# Patient Record
Sex: FEMALE | Race: ASIAN | NOT HISPANIC OR LATINO | Employment: FULL TIME | URBAN - METROPOLITAN AREA
[De-identification: names, ages, dates, MRNs, and addresses within clinical notes are randomized per-mention and may not be internally consistent; named-entity substitution may affect disease eponyms.]

---

## 2017-03-16 ENCOUNTER — COMMUNICATION - HEALTHEAST (OUTPATIENT)
Dept: FAMILY MEDICINE | Facility: CLINIC | Age: 32
End: 2017-03-16

## 2017-03-16 ENCOUNTER — COMMUNICATION - HEALTHEAST (OUTPATIENT)
Dept: SCHEDULING | Facility: CLINIC | Age: 32
End: 2017-03-16

## 2020-03-25 ENCOUNTER — OFFICE VISIT - HEALTHEAST (OUTPATIENT)
Dept: FAMILY MEDICINE | Facility: CLINIC | Age: 35
End: 2020-03-25

## 2020-03-25 DIAGNOSIS — D17.30 LIPOMA OF SKIN AND SUBCUTANEOUS TISSUE: ICD-10-CM

## 2020-12-08 ENCOUNTER — COMMUNICATION - HEALTHEAST (OUTPATIENT)
Dept: FAMILY MEDICINE | Facility: CLINIC | Age: 35
End: 2020-12-08

## 2020-12-09 ENCOUNTER — COMMUNICATION - HEALTHEAST (OUTPATIENT)
Dept: FAMILY MEDICINE | Facility: CLINIC | Age: 35
End: 2020-12-09

## 2020-12-09 DIAGNOSIS — U07.1 2019 NOVEL CORONAVIRUS DISEASE (COVID-19): ICD-10-CM

## 2020-12-09 RX ORDER — BENZONATATE 200 MG/1
200 CAPSULE ORAL 3 TIMES DAILY PRN
Qty: 15 CAPSULE | Refills: 1 | Status: SHIPPED | OUTPATIENT
Start: 2020-12-09 | End: 2024-01-18

## 2020-12-16 ENCOUNTER — COMMUNICATION - HEALTHEAST (OUTPATIENT)
Dept: FAMILY MEDICINE | Facility: CLINIC | Age: 35
End: 2020-12-16

## 2021-01-26 ENCOUNTER — COMMUNICATION - HEALTHEAST (OUTPATIENT)
Dept: FAMILY MEDICINE | Facility: CLINIC | Age: 36
End: 2021-01-26

## 2021-05-29 ENCOUNTER — RECORDS - HEALTHEAST (OUTPATIENT)
Dept: ADMINISTRATIVE | Facility: CLINIC | Age: 36
End: 2021-05-29

## 2021-05-30 ENCOUNTER — RECORDS - HEALTHEAST (OUTPATIENT)
Dept: ADMINISTRATIVE | Facility: CLINIC | Age: 36
End: 2021-05-30

## 2021-06-02 ENCOUNTER — RECORDS - HEALTHEAST (OUTPATIENT)
Dept: ADMINISTRATIVE | Facility: CLINIC | Age: 36
End: 2021-06-02

## 2021-06-04 VITALS
RESPIRATION RATE: 16 BRPM | OXYGEN SATURATION: 100 % | SYSTOLIC BLOOD PRESSURE: 134 MMHG | DIASTOLIC BLOOD PRESSURE: 88 MMHG | BODY MASS INDEX: 33.84 KG/M2 | WEIGHT: 185 LBS | TEMPERATURE: 98.2 F | HEART RATE: 95 BPM

## 2021-06-07 NOTE — PROGRESS NOTES
Assessment:     1. Lipoma of skin and subcutaneous tissue            Plan:     Symptoms consistent with a benign lipoma.  Reassurance given.  No treatment indicated.  Recommend following up if developing increased redness, warmth, pain, or any other concerning symptoms.    Subjective:       34 y.o. female presents for evaluation of a soft lump that she noticed on her left side since yesterday.  It is not been painful.  It is not been red.  She has not had a fever and is otherwise feeling well.  She denies any abdominal pain.  No other complaints or concerns today.    Patient Active Problem List   Diagnosis     Vitamin D Deficiency     Supervision of normal pregnancy     Obesity (BMI 30.0-34.9)     Postpartum tubal ligation planned      (normal spontaneous vaginal delivery)       Past Medical History:   Diagnosis Date     Intrauterine device (IUD) contraceptive failure resulting in pregnancy 2015       Past Surgical History:   Procedure Laterality Date     POSTPARTUM TUBAL LIGATION Bilateral 2016    Procedure: TUBAL LIGATION POST PARTUM;  Surgeon: Jessenia Neal MD;  Location: [facility];  Service:        Current Outpatient Medications on File Prior to Visit   Medication Sig Dispense Refill     famotidine (PEPCID) 20 MG tablet Take 1 tablet (20 mg total) by mouth bedtime. 30 tablet 0     No current facility-administered medications on file prior to visit.        Allergies   Allergen Reactions     No Known Drug Allergies        Family History   Problem Relation Age of Onset     Diabetes Mother      Depression Mother      Hyperlipidemia Father      No Medical Problems Sister      No Medical Problems Sister      No Medical Problems Sister      No Medical Problems Sister      No Medical Problems Sister      No Medical Problems Brother      No Medical Problems Brother        Social History     Socioeconomic History     Marital status: Single     Spouse name: None     Number of children: None      Years of education: None     Highest education level: None   Occupational History     None   Social Needs     Financial resource strain: None     Food insecurity     Worry: None     Inability: None     Transportation needs     Medical: None     Non-medical: None   Tobacco Use     Smoking status: Never Smoker     Smokeless tobacco: Never Used   Substance and Sexual Activity     Alcohol use: No     Drug use: No     Sexual activity: Yes     Partners: Male   Lifestyle     Physical activity     Days per week: None     Minutes per session: None     Stress: None   Relationships     Social connections     Talks on phone: None     Gets together: None     Attends [employer] service: None     Active member of club or organization: None     Attends meetings of clubs or organizations: None     Relationship status: None     Intimate partner violence     Fear of current or ex partner: None     Emotionally abused: None     Physically abused: None     Forced sexual activity: None   Other Topics Concern     None   Social History Narrative     None         Review of Systems  A 12 point comprehensive review of systems was negative except as noted.      Objective:      /88 (Patient Site: Left Arm, Patient Position: Sitting, Cuff Size: Adult Regular)   Pulse 95   Temp 98.2  F (36.8  C) (Oral)   Resp 16   Wt 185 lb (83.9 kg)   LMP 02/21/2020   SpO2 100%   BMI 33.84 kg/m    General appearance: alert, appears stated age and cooperative  Skin: Patient with a soft, nontender, subcutaneous mass overlying her left anterior lateral chest wall just inferior and lateral to her left breast that is fairly well circumscribed.  There is no overlying redness.  No fluctuance or induration.  Lump measures approximately 6 cm x 5 cm.        This note has been dictated using voice recognition software. Any grammatical or context distortions are unintentional and inherent to the software

## 2021-06-13 NOTE — TELEPHONE ENCOUNTER
Faxed form to [employer] successfully.  Received fax confirmation.  Called pt to relay will be mailing copy of form to her.  Thanks,

## 2021-06-13 NOTE — TELEPHONE ENCOUNTER
Who is requesting the letter?  Patient  Why is the letter needed? Patient tested positive for Covid & needs a letter for her employer. She has been out today & yesterday. She is requesting a letter excusing her from work this week. Her symptoms are not improving, per patient.  How would you like this letter returned? Please send letter to her MyChart  Okay to leave a detailed message? Yes

## 2021-06-13 NOTE — TELEPHONE ENCOUNTER
Writer called the patient. The patient still has a cough. Her symptoms began on December 2, 2020, patient was tested on December 3, 2020. Results came in on 12/4/2020 (POSITIVE).     The patient was tested for COVID at the [facility] in [place], [place] (walk-in's). The patient does not feel great. The patient has a headache, fever, extreme fatigue, chills, night sweats, sore throat, diarrhea.

## 2021-06-13 NOTE — TELEPHONE ENCOUNTER
Completed form and called pt to relay PCP is out until 12/29.  Does she wish covering provider to sign or wait for PCP.  Thanks.

## 2021-06-13 NOTE — TELEPHONE ENCOUNTER
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  CMT completed and prepped form.  PCP is not back until 12/29.  Pt asked if covering provider could sign paperwork.  Placing on covering provider's desk. Please return to CMT.  Thanks.

## 2021-06-13 NOTE — TELEPHONE ENCOUNTER
I'm not sure how I can provide a letter with this minimal information.  Generally speaking, she should be able to show her employer the positive result, then they can follow guidelines from [employer] regarding when to return to work (10 days after positive test, unless not improving or was hospitalized).    If does really need note, I need to know when her symptoms started, when and where she was tested, and how she's feeling now.

## 2021-06-13 NOTE — TELEPHONE ENCOUNTER
Writer apologizes but no fax number available to writer. This is not in Media and CMT did not see forms. MD to clarify.

## 2021-06-18 NOTE — PATIENT INSTRUCTIONS - HE
Patient Instructions by Emmy Anthony MD at 3/25/2020  5:00 PM     Author: Emmy Anthony MD Service: -- Author Type: Physician    Filed: 3/25/2020  5:01 PM Encounter Date: 3/25/2020 Status: Signed    : Emmy Anthony MD (Physician)         Patient Education     Lipoma, No Treatment  A lipoma is a local overgrowth of fatty tissue. It appears as a soft raised area, usually less than 2 inches across. It is a benign condition (not cancer).   Home care  General information regarding lipoma includes:  1. No special care is needed for a lipoma.  2. You can consider removal for cosmetic reasons.  3. Sometimes lipomas are uncomfortable because they put pressure on surrounding tissues. This is also a reason to have a lipoma removed.  Follow-up care  Follow up with your healthcare provider, or as advised if you want to have the lipoma removed at a later time.  When to seek medical advice  Call your healthcare provider right away if any of the following occur:    Redness, pain, tenderness, or drainage from the lipoma    Lipoma begins to enlarge or change shape    Changes in the color of the skin over the lipoma  Date Last Reviewed: 6/1/2016 2000-2017 [employer]. [address] [place] [address], [place] [address]. All rights reserved. This information is not intended as a substitute for professional medical care. Always follow your healthcare professional's instructions.

## 2021-06-21 NOTE — LETTER
Letter by Steph Huerta MD at      Author: Steph Huerta MD Service: -- Author Type: --    Filed:  Encounter Date: 12/8/2020 Status: (Other)         Priya Davis  [address] [place] [address]                 December 8, 2020       To whom it may concern,      Priya Davis is my patient. Priya tested positive for COVID-19 disease on December 3, 2020.         Sincerely,        Electronically signed by Steph Huerta MD

## 2021-06-21 NOTE — LETTER
Letter by Steph Huerta MD at      Author: Steph Huerta MD Service: -- Author Type: --    Filed:  Encounter Date: 1/26/2021 Status: (Other)         January 26, 2021     Patient: Priya Davis   YOB: 1985       To Whom It May Concern:    Priya Davis reported to clinic that she was not feeling well 1/25/21-1/26/21.  Please excuse her from work on those days.    Sincerely,        Electronically signed by Steph Huerta MD

## 2021-06-27 ENCOUNTER — HEALTH MAINTENANCE LETTER (OUTPATIENT)
Age: 36
End: 2021-06-27

## 2021-10-17 ENCOUNTER — HEALTH MAINTENANCE LETTER (OUTPATIENT)
Age: 36
End: 2021-10-17

## 2022-07-24 ENCOUNTER — HEALTH MAINTENANCE LETTER (OUTPATIENT)
Age: 37
End: 2022-07-24

## 2022-10-02 ENCOUNTER — HEALTH MAINTENANCE LETTER (OUTPATIENT)
Age: 37
End: 2022-10-02

## 2023-02-06 ENCOUNTER — HOSPITAL ENCOUNTER (EMERGENCY)
Facility: HOSPITAL | Age: 38
Discharge: HOME | End: 2023-02-07
Attending: FAMILY MEDICINE | Admitting: FAMILY MEDICINE
Payer: COMMERCIAL

## 2023-02-06 DIAGNOSIS — R10.13 DYSPEPSIA: ICD-10-CM

## 2023-02-06 DIAGNOSIS — R10.13 EPIGASTRIC PAIN: ICD-10-CM

## 2023-02-06 PROCEDURE — 99284 EMERGENCY DEPT VISIT MOD MDM: CPT | Mod: 25

## 2023-02-06 PROCEDURE — 96374 THER/PROPH/DIAG INJ IV PUSH: CPT

## 2023-02-06 PROCEDURE — 93005 ELECTROCARDIOGRAM TRACING: CPT | Performed by: STUDENT IN AN ORGANIZED HEALTH CARE EDUCATION/TRAINING PROGRAM

## 2023-02-07 VITALS
HEIGHT: 61 IN | TEMPERATURE: 97.5 F | DIASTOLIC BLOOD PRESSURE: 94 MMHG | RESPIRATION RATE: 20 BRPM | BODY MASS INDEX: 35.12 KG/M2 | WEIGHT: 186 LBS | HEART RATE: 79 BPM | OXYGEN SATURATION: 99 % | SYSTOLIC BLOOD PRESSURE: 127 MMHG

## 2023-02-07 LAB
ALBUMIN SERPL BCG-MCNC: 4 G/DL (ref 3.5–5.2)
ALP SERPL-CCNC: 71 U/L (ref 35–104)
ALT SERPL W P-5'-P-CCNC: 14 U/L (ref 10–35)
ANION GAP SERPL CALCULATED.3IONS-SCNC: 9 MMOL/L (ref 7–15)
AST SERPL W P-5'-P-CCNC: 15 U/L (ref 10–35)
BASOPHILS # BLD AUTO: 0.1 10E3/UL (ref 0–0.2)
BASOPHILS NFR BLD AUTO: 1 %
BILIRUB DIRECT SERPL-MCNC: <0.2 MG/DL (ref 0–0.3)
BILIRUB SERPL-MCNC: 0.3 MG/DL
BUN SERPL-MCNC: 12.2 MG/DL (ref 6–20)
CALCIUM SERPL-MCNC: 9.1 MG/DL (ref 8.6–10)
CHLORIDE SERPL-SCNC: 101 MMOL/L (ref 98–107)
CREAT SERPL-MCNC: 0.83 MG/DL (ref 0.51–0.95)
DEPRECATED HCO3 PLAS-SCNC: 27 MMOL/L (ref 22–29)
EOSINOPHIL # BLD AUTO: 0.2 10E3/UL (ref 0–0.7)
EOSINOPHIL NFR BLD AUTO: 1 %
ERYTHROCYTE [DISTWIDTH] IN BLOOD BY AUTOMATED COUNT: 12.2 % (ref 10–15)
GFR SERPL CREATININE-BSD FRML MDRD: >90 ML/MIN/1.73M2
GLUCOSE SERPL-MCNC: 160 MG/DL (ref 70–99)
HCT VFR BLD AUTO: 38.4 % (ref 35–47)
HGB BLD-MCNC: 12.6 G/DL (ref 11.7–15.7)
IMM GRANULOCYTES # BLD: 0.1 10E3/UL
IMM GRANULOCYTES NFR BLD: 0 %
INR PPP: 1.01 (ref 0.85–1.15)
LIPASE SERPL-CCNC: 22 U/L (ref 13–60)
LYMPHOCYTES # BLD AUTO: 2.1 10E3/UL (ref 0.8–5.3)
LYMPHOCYTES NFR BLD AUTO: 18 %
MAGNESIUM SERPL-MCNC: 1.7 MG/DL (ref 1.7–2.3)
MCH RBC QN AUTO: 29.2 PG (ref 26.5–33)
MCHC RBC AUTO-ENTMCNC: 32.8 G/DL (ref 31.5–36.5)
MCV RBC AUTO: 89 FL (ref 78–100)
MONOCYTES # BLD AUTO: 0.7 10E3/UL (ref 0–1.3)
MONOCYTES NFR BLD AUTO: 6 %
NEUTROPHILS # BLD AUTO: 8.7 10E3/UL (ref 1.6–8.3)
NEUTROPHILS NFR BLD AUTO: 74 %
NRBC # BLD AUTO: 0 10E3/UL
NRBC BLD AUTO-RTO: 0 /100
PLATELET # BLD AUTO: 434 10E3/UL (ref 150–450)
POTASSIUM SERPL-SCNC: 3.9 MMOL/L (ref 3.4–5.3)
PROT SERPL-MCNC: 7.6 G/DL (ref 6.4–8.3)
RBC # BLD AUTO: 4.32 10E6/UL (ref 3.8–5.2)
SODIUM SERPL-SCNC: 137 MMOL/L (ref 136–145)
TROPONIN T SERPL HS-MCNC: <6 NG/L
WBC # BLD AUTO: 11.7 10E3/UL (ref 4–11)

## 2023-02-07 PROCEDURE — 85048 AUTOMATED LEUKOCYTE COUNT: CPT | Performed by: FAMILY MEDICINE

## 2023-02-07 PROCEDURE — 250N000013 HC RX MED GY IP 250 OP 250 PS 637: Performed by: FAMILY MEDICINE

## 2023-02-07 PROCEDURE — 84484 ASSAY OF TROPONIN QUANT: CPT | Performed by: FAMILY MEDICINE

## 2023-02-07 PROCEDURE — 83690 ASSAY OF LIPASE: CPT | Performed by: FAMILY MEDICINE

## 2023-02-07 PROCEDURE — 82248 BILIRUBIN DIRECT: CPT | Performed by: FAMILY MEDICINE

## 2023-02-07 PROCEDURE — 250N000009 HC RX 250: Performed by: FAMILY MEDICINE

## 2023-02-07 PROCEDURE — 250N000011 HC RX IP 250 OP 636: Performed by: FAMILY MEDICINE

## 2023-02-07 PROCEDURE — 36415 COLL VENOUS BLD VENIPUNCTURE: CPT | Performed by: FAMILY MEDICINE

## 2023-02-07 PROCEDURE — 85610 PROTHROMBIN TIME: CPT | Performed by: FAMILY MEDICINE

## 2023-02-07 PROCEDURE — 83735 ASSAY OF MAGNESIUM: CPT | Performed by: FAMILY MEDICINE

## 2023-02-07 RX ORDER — SUCRALFATE 1 G/1
1 TABLET ORAL 4 TIMES DAILY
Qty: 28 TABLET | Refills: 0 | Status: SHIPPED | OUTPATIENT
Start: 2023-02-07 | End: 2023-02-14

## 2023-02-07 RX ORDER — FAMOTIDINE 20 MG/1
20 TABLET, FILM COATED ORAL AT BEDTIME
Qty: 30 TABLET | Refills: 0 | Status: SHIPPED | OUTPATIENT
Start: 2023-02-07 | End: 2024-01-18

## 2023-02-07 RX ADMIN — LIDOCAINE HYDROCHLORIDE 30 ML: 20 SOLUTION ORAL; TOPICAL at 01:09

## 2023-02-07 RX ADMIN — FAMOTIDINE 20 MG: 10 INJECTION, SOLUTION INTRAVENOUS at 01:08

## 2023-02-07 ASSESSMENT — ENCOUNTER SYMPTOMS
NAUSEA: 1
CHEST TIGHTNESS: 1
VOMITING: 0

## 2023-02-07 ASSESSMENT — ACTIVITIES OF DAILY LIVING (ADL): ADLS_ACUITY_SCORE: 35

## 2023-02-07 NOTE — ED TRIAGE NOTES
CP, epigastric pain, nausea, for 1 hour. Has had episodes in the past, treated for heartburn. She tried 500mg Tylenol and 3 Tums, and 10ml of Pepto with no relief. Pain a 7. Non smoker.     Triage Assessment       Row Name 02/06/23 4316       Triage Assessment (Adult)    Airway WDL WDL       Respiratory WDL    Respiratory WDL WDL       Skin Circulation/Temperature WDL    Skin Circulation/Temperature WDL WDL       Cardiac WDL    Cardiac WDL WDL       Peripheral/Neurovascular WDL    Peripheral Neurovascular WDL WDL       Cognitive/Neuro/Behavioral WDL    Cognitive/Neuro/Behavioral WDL WDL

## 2023-02-07 NOTE — ED PROVIDER NOTES
EMERGENCY DEPARTMENT ENCOUNTER      NAME: Priya Davis  AGE: 37 year old female  YOB: 1985  MRN: [id number]  EVALUATION DATE & TIME: No admission date for patient encounter.    PCP: Steph Huerta    ED PROVIDER: Star Farooq M.D.    Chief Complaint   Patient presents with     CP/heart burn/nausea       FINAL IMPRESSION:  1. Epigastric pain    2. Dyspepsia        ED COURSE & MEDICAL DECISION MAKING:    Pertinent Labs & Imaging studies independently interpreted by me. (See chart for details)  11:57 PM Patient seen and examined, external records reviewed show prior evaluation in the emergency department for chest pain, however this was about 5 years ago and no recent emergency department visits.  Patient presents today with about 1 hour of epigastric pain, with tenderness on exam.  EKG is reassuring.  Labs are ordered.  Consider right upper quadrant ultrasound but no right upper quadrant tenderness on exam.  If labs are concerning for acute cholecystitis or biliary obstruction, will order imaging either ultrasound or CT.  GI cocktail and Pepcid are ordered for symptom management.  1:30 AM labs demonstrate mild leukocytosis, hepatic function panel and lipase are normal, basic panel is reassuring.  Troponin is pending.  If this is normal, patient can be discharged with outpatient follow-up.  1:37 AM troponin less than 6 which is rules out for myocardial injury.  Patient feels better after treatment.  Patient will be discharged with Carafate and Pepcid, follow-up with primary care.  Use Maalox as well as needed.    At the conclusion of the encounter I discussed the results of all of the tests and the disposition. The questions were answered. The patient or family acknowledged understanding and was agreeable with the care plan.     Medical Decision Making    History:    Supplemental history from: Documented in chart, if applicable    External Record(s) reviewed: Documented in chart, if  applicable.    Work Up:    Chart documentation includes differential considered and any EKGs or imaging independently interpreted by provider, where specified.    In additional to work up documented, I considered the following work up: Documented in chart, if applicable.    External consultation:    Discussion of management with another provider: Documented in chart, if applicable    Complicating factors:    Care impacted by chronic illness: Other: Dyspepsia    Care affected by social determinants of health: N/A    Disposition considerations: Discharge. I prescribed additional prescription strength medication(s) as charted. I considered admission, but discharged patient after significant clinical improvement.      MEDICATIONS GIVEN IN THE EMERGENCY:  Medications   famotidine (PEPCID) injection 20 mg (20 mg Intravenous Given 2/7/23 0108)   lidocaine (viscous) (XYLOCAINE) 2 % 15 mL, alum & mag hydroxide-simethicone (MAALOX) 15 mL GI Cocktail (30 mLs Oral Given 2/7/23 0109)       NEW PRESCRIPTIONS STARTED AT TODAY'S ER VISIT  New Prescriptions    SUCRALFATE (CARAFATE) 1 GM TABLET    Take 1 tablet (1 g) by mouth 4 times daily for 7 days       =================================================================    HPI    Patient information was obtained from: Patient      Priya Davis is a 37 year old female with no pertinent medical history who presents to this ED via private vehicle for evaluation of chest pain. Patient states that ~1 hour ago she developed epigastric chest pain with no radiation. She describes it as tight in nature and it is 7/10 severity. She notes that she had a late dinner and attributed her symptoms to acid reflux. She has taken 500 mg tylenol, 3 Tums, and 10 mL of Pepto with no relief. Pain is exacerbated with laying down and breathing. She endorses having similar episodes in the past. Endorses nausea. Denies vomiting.    Of note, patient denies a history of smoking and endorses occasional alcohol use  "with none today. She also endorses caffeine use but none today. Denies any routine medication or recent illness.      REVIEW OF SYSTEMS   Review of Systems   Respiratory: Positive for chest tightness.    Cardiovascular: Positive for chest pain.   Gastrointestinal: Positive for nausea. Negative for vomiting.      All other systems reviewed and negative    PAST MEDICAL HISTORY:  Past Medical History:   Diagnosis Date     Intrauterine device (IUD) contraceptive failure resulting in pregnancy 5/27/2015       PAST SURGICAL HISTORY:  Past Surgical History:   Procedure Laterality Date     POSTPARTUM TUBAL LIGATION Bilateral 7/28/2016    Procedure: TUBAL LIGATION POST PARTUM;  Surgeon: Jessenia Neal MD;  Location: [facility];  Service:        CURRENT MEDICATIONS:    No current facility-administered medications for this encounter.     Current Outpatient Medications   Medication     famotidine (PEPCID) 20 MG tablet     sucralfate (CARAFATE) 1 GM tablet     benzonatate (TESSALON) 200 MG capsule       ALLERGIES:  Allergies   Allergen Reactions     No Known Drug Allergies Unknown       FAMILY HISTORY:  Family History   Problem Relation Age of Onset     Diabetes Mother      Depression Mother      Hyperlipidemia Father      No Known Problems Sister      No Known Problems Sister      No Known Problems Sister      No Known Problems Sister      No Known Problems Sister      No Known Problems Brother      No Known Problems Brother        SOCIAL HISTORY:   Social History     Socioeconomic History     Marital status: Single   Tobacco Use     Smoking status: Never     Smokeless tobacco: Never   Substance and Sexual Activity     Alcohol use: No     Drug use: No     Sexual activity: Yes     Partners: Male       VITALS:  BP (!) 167/104   Pulse 79   Temp 97.5  F (36.4  C) (Temporal)   Resp 20   Ht 1.549 m (5' 1\")   Wt 84.4 kg (186 lb)   LMP 01/27/2023   SpO2 100%   BMI 35.14 kg/m      PHYSICAL EXAM:  Physical " Exam  Vitals and nursing note reviewed.   Constitutional:       Appearance: Normal appearance.   HENT:      Head: Normocephalic and atraumatic.      Right Ear: External ear normal.      Left Ear: External ear normal.      Nose: Nose normal.      Mouth/Throat:      Mouth: Mucous membranes are moist.   Eyes:      Extraocular Movements: Extraocular movements intact.      Conjunctiva/sclera: Conjunctivae normal.      Pupils: Pupils are equal, round, and reactive to light.   Cardiovascular:      Rate and Rhythm: Normal rate and regular rhythm.   Pulmonary:      Effort: Pulmonary effort is normal.      Breath sounds: Normal breath sounds. No wheezing or rales.   Chest:      Chest wall: Tenderness (epigastric) present.   Abdominal:      General: Abdomen is flat. There is no distension.      Palpations: Abdomen is soft.      Tenderness: There is no abdominal tenderness. There is no guarding.   Musculoskeletal:         General: Normal range of motion.      Cervical back: Normal range of motion and neck supple.      Right lower leg: No edema.      Left lower leg: No edema.   Lymphadenopathy:      Cervical: No cervical adenopathy.   Skin:     General: Skin is warm and dry.   Neurological:      General: No focal deficit present.      Mental Status: She is alert and oriented to person, place, and time. Mental status is at baseline.      Comments: No gross focal neurologic deficits   Psychiatric:         Mood and Affect: Mood normal.         Behavior: Behavior normal.         Thought Content: Thought content normal.          LAB:  All pertinent labs reviewed and interpreted.  Results for orders placed or performed during the hospital encounter of 02/06/23   Basic metabolic panel   Result Value Ref Range    Sodium 137 136 - 145 mmol/L    Potassium 3.9 3.4 - 5.3 mmol/L    Chloride 101 98 - 107 mmol/L    Carbon Dioxide (CO2) 27 22 - 29 mmol/L    Anion Gap 9 7 - 15 mmol/L    Urea Nitrogen 12.2 6.0 - 20.0 mg/dL    Creatinine 0.83 0.51  - 0.95 mg/dL    Calcium 9.1 8.6 - 10.0 mg/dL    Glucose 160 (H) 70 - 99 mg/dL    GFR Estimate >90 >60 mL/min/1.73m2   Result Value Ref Range    INR 1.01 0.85 - 1.15   Result Value Ref Range    Troponin T, High Sensitivity <6 <=14 ng/L   Result Value Ref Range    Magnesium 1.7 1.7 - 2.3 mg/dL   Result Value Ref Range    Lipase 22 13 - 60 U/L   Hepatic function panel   Result Value Ref Range    Protein Total 7.6 6.4 - 8.3 g/dL    Albumin 4.0 3.5 - 5.2 g/dL    Bilirubin Total 0.3 <=1.2 mg/dL    Alkaline Phosphatase 71 35 - 104 U/L    AST 15 10 - 35 U/L    ALT 14 10 - 35 U/L    Bilirubin Direct <0.20 0.00 - 0.30 mg/dL   CBC with platelets and differential   Result Value Ref Range    WBC Count 11.7 (H) 4.0 - 11.0 10e3/uL    RBC Count 4.32 3.80 - 5.20 10e6/uL    Hemoglobin 12.6 11.7 - 15.7 g/dL    Hematocrit 38.4 35.0 - 47.0 %    MCV 89 78 - 100 fL    MCH 29.2 26.5 - 33.0 pg    MCHC 32.8 31.5 - 36.5 g/dL    RDW 12.2 10.0 - 15.0 %    Platelet Count 434 150 - 450 10e3/uL    % Neutrophils 74 %    % Lymphocytes 18 %    % Monocytes 6 %    % Eosinophils 1 %    % Basophils 1 %    % Immature Granulocytes 0 %    NRBCs per 100 WBC 0 <1 /100    Absolute Neutrophils 8.7 (H) 1.6 - 8.3 10e3/uL    Absolute Lymphocytes 2.1 0.8 - 5.3 10e3/uL    Absolute Monocytes 0.7 0.0 - 1.3 10e3/uL    Absolute Eosinophils 0.2 0.0 - 0.7 10e3/uL    Absolute Basophils 0.1 0.0 - 0.2 10e3/uL    Absolute Immature Granulocytes 0.1 <=0.4 10e3/uL    Absolute NRBCs 0.0 10e3/uL       RADIOLOGY:  Reviewed all pertinent imaging. Please see official radiology report.  No orders to display       EKG:    Performed at: 11:37 PM  Impression: Normal EKG  Rate: 70  Rhythm: Sinus  Axis: Normal  SD Interval: 166  QRS Interval: 94  QTc Interval: 442  ST Changes: No acute ischemic changes  Comparison: June 2017, no acute changes    I have independently reviewed and interpreted the EKG(s) documented above.    IAn, am serving as a scribe to document services  personally performed by Dr. Farooq based on my observation and the provider's statements to me. I, Star Farooq MD attest that An Myers is acting in a scribe capacity, has observed my performance of the services and has documented them in accordance with my direction.    Star Farooq M.D.  Emergency Medicine  [facility] EMERGENCY DEPARTMENT  [address] [place] [id number] [phone]  Dept: [phone]     Star Farooq MD  02/07/23 0219

## 2023-02-09 ENCOUNTER — APPOINTMENT (OUTPATIENT)
Dept: ULTRASOUND IMAGING | Facility: HOSPITAL | Age: 38
End: 2023-02-09
Attending: STUDENT IN AN ORGANIZED HEALTH CARE EDUCATION/TRAINING PROGRAM
Payer: COMMERCIAL

## 2023-02-09 ENCOUNTER — HOSPITAL ENCOUNTER (OUTPATIENT)
Facility: HOSPITAL | Age: 38
Discharge: HOME | End: 2023-02-10
Attending: STUDENT IN AN ORGANIZED HEALTH CARE EDUCATION/TRAINING PROGRAM | Admitting: SPECIALIST
Payer: COMMERCIAL

## 2023-02-09 DIAGNOSIS — R10.13 EPIGASTRIC PAIN: ICD-10-CM

## 2023-02-09 LAB
BASOPHILS # BLD AUTO: 0.1 10E3/UL (ref 0–0.2)
BASOPHILS NFR BLD AUTO: 1 %
EOSINOPHIL # BLD AUTO: 0.2 10E3/UL (ref 0–0.7)
EOSINOPHIL NFR BLD AUTO: 1 %
ERYTHROCYTE [DISTWIDTH] IN BLOOD BY AUTOMATED COUNT: 12.3 % (ref 10–15)
HCT VFR BLD AUTO: 40.7 % (ref 35–47)
HGB BLD-MCNC: 13.4 G/DL (ref 11.7–15.7)
IMM GRANULOCYTES # BLD: 0.1 10E3/UL
IMM GRANULOCYTES NFR BLD: 0 %
LYMPHOCYTES # BLD AUTO: 2.8 10E3/UL (ref 0.8–5.3)
LYMPHOCYTES NFR BLD AUTO: 21 %
MCH RBC QN AUTO: 29.2 PG (ref 26.5–33)
MCHC RBC AUTO-ENTMCNC: 32.9 G/DL (ref 31.5–36.5)
MCV RBC AUTO: 89 FL (ref 78–100)
MONOCYTES # BLD AUTO: 0.7 10E3/UL (ref 0–1.3)
MONOCYTES NFR BLD AUTO: 5 %
NEUTROPHILS # BLD AUTO: 9.8 10E3/UL (ref 1.6–8.3)
NEUTROPHILS NFR BLD AUTO: 72 %
NRBC # BLD AUTO: 0 10E3/UL
NRBC BLD AUTO-RTO: 0 /100
PLATELET # BLD AUTO: 440 10E3/UL (ref 150–450)
RBC # BLD AUTO: 4.59 10E6/UL (ref 3.8–5.2)
WBC # BLD AUTO: 13.5 10E3/UL (ref 4–11)

## 2023-02-09 PROCEDURE — 250N000013 HC RX MED GY IP 250 OP 250 PS 637: Performed by: STUDENT IN AN ORGANIZED HEALTH CARE EDUCATION/TRAINING PROGRAM

## 2023-02-09 PROCEDURE — C9803 HOPD COVID-19 SPEC COLLECT: HCPCS

## 2023-02-09 PROCEDURE — 99285 EMERGENCY DEPT VISIT HI MDM: CPT | Mod: 25

## 2023-02-09 PROCEDURE — 76705 ECHO EXAM OF ABDOMEN: CPT

## 2023-02-09 PROCEDURE — 36415 COLL VENOUS BLD VENIPUNCTURE: CPT | Performed by: STUDENT IN AN ORGANIZED HEALTH CARE EDUCATION/TRAINING PROGRAM

## 2023-02-09 PROCEDURE — 86901 BLOOD TYPING SEROLOGIC RH(D): CPT | Performed by: STUDENT IN AN ORGANIZED HEALTH CARE EDUCATION/TRAINING PROGRAM

## 2023-02-09 PROCEDURE — 85025 COMPLETE CBC W/AUTO DIFF WBC: CPT | Performed by: STUDENT IN AN ORGANIZED HEALTH CARE EDUCATION/TRAINING PROGRAM

## 2023-02-09 PROCEDURE — 96375 TX/PRO/DX INJ NEW DRUG ADDON: CPT

## 2023-02-09 PROCEDURE — 96365 THER/PROPH/DIAG IV INF INIT: CPT

## 2023-02-09 PROCEDURE — 84703 CHORIONIC GONADOTROPIN ASSAY: CPT | Performed by: STUDENT IN AN ORGANIZED HEALTH CARE EDUCATION/TRAINING PROGRAM

## 2023-02-09 PROCEDURE — 93005 ELECTROCARDIOGRAM TRACING: CPT | Performed by: STUDENT IN AN ORGANIZED HEALTH CARE EDUCATION/TRAINING PROGRAM

## 2023-02-09 PROCEDURE — 83690 ASSAY OF LIPASE: CPT | Performed by: STUDENT IN AN ORGANIZED HEALTH CARE EDUCATION/TRAINING PROGRAM

## 2023-02-09 PROCEDURE — 86850 RBC ANTIBODY SCREEN: CPT | Performed by: STUDENT IN AN ORGANIZED HEALTH CARE EDUCATION/TRAINING PROGRAM

## 2023-02-09 PROCEDURE — 250N000011 HC RX IP 250 OP 636: Performed by: STUDENT IN AN ORGANIZED HEALTH CARE EDUCATION/TRAINING PROGRAM

## 2023-02-09 PROCEDURE — 250N000009 HC RX 250: Performed by: STUDENT IN AN ORGANIZED HEALTH CARE EDUCATION/TRAINING PROGRAM

## 2023-02-09 RX ORDER — ONDANSETRON 2 MG/ML
4 INJECTION INTRAMUSCULAR; INTRAVENOUS ONCE
Status: COMPLETED | OUTPATIENT
Start: 2023-02-09 | End: 2023-02-09

## 2023-02-09 RX ADMIN — ALUMINUM HYDROXIDE, MAGNESIUM HYDROXIDE, AND SIMETHICONE 30 ML: 200; 200; 20 SUSPENSION ORAL at 23:22

## 2023-02-09 RX ADMIN — ONDANSETRON 4 MG: 2 INJECTION INTRAMUSCULAR; INTRAVENOUS at 23:33

## 2023-02-09 ASSESSMENT — ENCOUNTER SYMPTOMS
FEVER: 0
BLOOD IN STOOL: 0
DIARRHEA: 0
DYSURIA: 0
ABDOMINAL PAIN: 1
NAUSEA: 1
COUGH: 0
VOMITING: 0
HEMATURIA: 0

## 2023-02-09 ASSESSMENT — ACTIVITIES OF DAILY LIVING (ADL): ADLS_ACUITY_SCORE: 33

## 2023-02-10 ENCOUNTER — ANESTHESIA EVENT (OUTPATIENT)
Dept: SURGERY | Facility: HOSPITAL | Age: 38
End: 2023-02-10
Payer: COMMERCIAL

## 2023-02-10 ENCOUNTER — TELEPHONE (OUTPATIENT)
Dept: SURGERY | Facility: CLINIC | Age: 38
End: 2023-02-10
Payer: COMMERCIAL

## 2023-02-10 ENCOUNTER — ANESTHESIA (OUTPATIENT)
Dept: SURGERY | Facility: HOSPITAL | Age: 38
End: 2023-02-10
Payer: COMMERCIAL

## 2023-02-10 VITALS
HEIGHT: 61 IN | DIASTOLIC BLOOD PRESSURE: 84 MMHG | HEART RATE: 97 BPM | WEIGHT: 192.3 LBS | OXYGEN SATURATION: 98 % | RESPIRATION RATE: 17 BRPM | TEMPERATURE: 97.6 F | BODY MASS INDEX: 36.31 KG/M2 | SYSTOLIC BLOOD PRESSURE: 139 MMHG

## 2023-02-10 DIAGNOSIS — R10.13 EPIGASTRIC PAIN: ICD-10-CM

## 2023-02-10 LAB
ABO/RH(D): NORMAL
ALBUMIN SERPL BCG-MCNC: 4 G/DL (ref 3.5–5.2)
ALP SERPL-CCNC: 79 U/L (ref 35–104)
ALT SERPL W P-5'-P-CCNC: 35 U/L (ref 10–35)
ANION GAP SERPL CALCULATED.3IONS-SCNC: 10 MMOL/L (ref 7–15)
ANTIBODY SCREEN: NEGATIVE
APTT PPP: 25 SECONDS (ref 22–38)
AST SERPL W P-5'-P-CCNC: 73 U/L (ref 10–35)
BILIRUB DIRECT SERPL-MCNC: 0.26 MG/DL (ref 0–0.3)
BILIRUB SERPL-MCNC: 0.6 MG/DL
BUN SERPL-MCNC: 12 MG/DL (ref 6–20)
CALCIUM SERPL-MCNC: 8.8 MG/DL (ref 8.6–10)
CHLORIDE SERPL-SCNC: 101 MMOL/L (ref 98–107)
CREAT SERPL-MCNC: 0.73 MG/DL (ref 0.51–0.95)
DEPRECATED HCO3 PLAS-SCNC: 26 MMOL/L (ref 22–29)
GFR SERPL CREATININE-BSD FRML MDRD: >90 ML/MIN/1.73M2
GLUCOSE SERPL-MCNC: 158 MG/DL (ref 70–99)
HCG SERPL QL: NEGATIVE
INR PPP: 1 (ref 0.85–1.15)
LIPASE SERPL-CCNC: 26 U/L (ref 13–60)
POTASSIUM SERPL-SCNC: 3.7 MMOL/L (ref 3.4–5.3)
PROT SERPL-MCNC: 7.4 G/DL (ref 6.4–8.3)
SARS-COV-2 RNA RESP QL NAA+PROBE: NEGATIVE
SODIUM SERPL-SCNC: 137 MMOL/L (ref 136–145)
SPECIMEN EXPIRATION DATE: NORMAL
TROPONIN T SERPL HS-MCNC: <6 NG/L

## 2023-02-10 PROCEDURE — 250N000011 HC RX IP 250 OP 636: Performed by: NURSE ANESTHETIST, CERTIFIED REGISTERED

## 2023-02-10 PROCEDURE — U0003 INFECTIOUS AGENT DETECTION BY NUCLEIC ACID (DNA OR RNA); SEVERE ACUTE RESPIRATORY SYNDROME CORONAVIRUS 2 (SARS-COV-2) (CORONAVIRUS DISEASE [COVID-19]), AMPLIFIED PROBE TECHNIQUE, MAKING USE OF HIGH THROUGHPUT TECHNOLOGIES AS DESCRIBED BY CMS-2020-01-R: HCPCS | Performed by: STUDENT IN AN ORGANIZED HEALTH CARE EDUCATION/TRAINING PROGRAM

## 2023-02-10 PROCEDURE — 250N000011 HC RX IP 250 OP 636: Performed by: ANESTHESIOLOGY

## 2023-02-10 PROCEDURE — 82248 BILIRUBIN DIRECT: CPT | Performed by: STUDENT IN AN ORGANIZED HEALTH CARE EDUCATION/TRAINING PROGRAM

## 2023-02-10 PROCEDURE — 710N000009 HC RECOVERY PHASE 1, LEVEL 1, PER MIN: Performed by: SPECIALIST

## 2023-02-10 PROCEDURE — 710N000012 HC RECOVERY PHASE 2, PER MINUTE: Performed by: SPECIALIST

## 2023-02-10 PROCEDURE — 99204 OFFICE O/P NEW MOD 45 MIN: CPT | Mod: 57 | Performed by: SPECIALIST

## 2023-02-10 PROCEDURE — 47562 LAPAROSCOPIC CHOLECYSTECTOMY: CPT | Performed by: SPECIALIST

## 2023-02-10 PROCEDURE — 250N000011 HC RX IP 250 OP 636: Performed by: STUDENT IN AN ORGANIZED HEALTH CARE EDUCATION/TRAINING PROGRAM

## 2023-02-10 PROCEDURE — 250N000011 HC RX IP 250 OP 636: Performed by: SPECIALIST

## 2023-02-10 PROCEDURE — 370N000017 HC ANESTHESIA TECHNICAL FEE, PER MIN: Performed by: SPECIALIST

## 2023-02-10 PROCEDURE — 360N000076 HC SURGERY LEVEL 3, PER MIN: Performed by: SPECIALIST

## 2023-02-10 PROCEDURE — 258N000003 HC RX IP 258 OP 636: Performed by: SPECIALIST

## 2023-02-10 PROCEDURE — 258N000003 HC RX IP 258 OP 636: Performed by: NURSE ANESTHETIST, CERTIFIED REGISTERED

## 2023-02-10 PROCEDURE — 85730 THROMBOPLASTIN TIME PARTIAL: CPT | Performed by: STUDENT IN AN ORGANIZED HEALTH CARE EDUCATION/TRAINING PROGRAM

## 2023-02-10 PROCEDURE — 36415 COLL VENOUS BLD VENIPUNCTURE: CPT | Performed by: STUDENT IN AN ORGANIZED HEALTH CARE EDUCATION/TRAINING PROGRAM

## 2023-02-10 PROCEDURE — 250N000009 HC RX 250: Performed by: NURSE ANESTHETIST, CERTIFIED REGISTERED

## 2023-02-10 PROCEDURE — 250N000013 HC RX MED GY IP 250 OP 250 PS 637: Performed by: SPECIALIST

## 2023-02-10 PROCEDURE — 88304 TISSUE EXAM BY PATHOLOGIST: CPT | Mod: TC | Performed by: SPECIALIST

## 2023-02-10 PROCEDURE — 84484 ASSAY OF TROPONIN QUANT: CPT | Performed by: STUDENT IN AN ORGANIZED HEALTH CARE EDUCATION/TRAINING PROGRAM

## 2023-02-10 PROCEDURE — 272N000001 HC OR GENERAL SUPPLY STERILE: Performed by: SPECIALIST

## 2023-02-10 PROCEDURE — 85610 PROTHROMBIN TIME: CPT | Performed by: STUDENT IN AN ORGANIZED HEALTH CARE EDUCATION/TRAINING PROGRAM

## 2023-02-10 RX ORDER — NALOXONE HYDROCHLORIDE 0.4 MG/ML
0.4 INJECTION, SOLUTION INTRAMUSCULAR; INTRAVENOUS; SUBCUTANEOUS
Status: DISCONTINUED | OUTPATIENT
Start: 2023-02-10 | End: 2023-02-10 | Stop reason: HOSPADM

## 2023-02-10 RX ORDER — PIPERACILLIN SODIUM, TAZOBACTAM SODIUM 3; .375 G/15ML; G/15ML
3.38 INJECTION, POWDER, LYOPHILIZED, FOR SOLUTION INTRAVENOUS ONCE
Status: COMPLETED | OUTPATIENT
Start: 2023-02-10 | End: 2023-02-10

## 2023-02-10 RX ORDER — NALOXONE HYDROCHLORIDE 0.4 MG/ML
0.2 INJECTION, SOLUTION INTRAMUSCULAR; INTRAVENOUS; SUBCUTANEOUS
Status: DISCONTINUED | OUTPATIENT
Start: 2023-02-10 | End: 2023-02-10 | Stop reason: HOSPADM

## 2023-02-10 RX ORDER — FENTANYL CITRATE 50 UG/ML
INJECTION, SOLUTION INTRAMUSCULAR; INTRAVENOUS PRN
Status: DISCONTINUED | OUTPATIENT
Start: 2023-02-10 | End: 2023-02-10

## 2023-02-10 RX ORDER — OXYCODONE HYDROCHLORIDE 5 MG/1
5 TABLET ORAL EVERY 4 HOURS PRN
Status: DISCONTINUED | OUTPATIENT
Start: 2023-02-10 | End: 2023-02-10 | Stop reason: HOSPADM

## 2023-02-10 RX ORDER — PROPOFOL 10 MG/ML
INJECTION, EMULSION INTRAVENOUS PRN
Status: DISCONTINUED | OUTPATIENT
Start: 2023-02-10 | End: 2023-02-10

## 2023-02-10 RX ORDER — HALOPERIDOL 5 MG/ML
1 INJECTION INTRAMUSCULAR
Status: DISCONTINUED | OUTPATIENT
Start: 2023-02-10 | End: 2023-02-10 | Stop reason: HOSPADM

## 2023-02-10 RX ORDER — KETOROLAC TROMETHAMINE 30 MG/ML
INJECTION, SOLUTION INTRAMUSCULAR; INTRAVENOUS PRN
Status: DISCONTINUED | OUTPATIENT
Start: 2023-02-10 | End: 2023-02-10

## 2023-02-10 RX ORDER — IBUPROFEN 200 MG
600 TABLET ORAL
Status: DISCONTINUED | OUTPATIENT
Start: 2023-02-10 | End: 2023-02-10 | Stop reason: HOSPADM

## 2023-02-10 RX ORDER — HYDROCODONE BITARTRATE AND ACETAMINOPHEN 5; 325 MG/1; MG/1
1 TABLET ORAL
Status: COMPLETED | OUTPATIENT
Start: 2023-02-10 | End: 2023-02-10

## 2023-02-10 RX ORDER — ONDANSETRON 2 MG/ML
4 INJECTION INTRAMUSCULAR; INTRAVENOUS EVERY 30 MIN PRN
Status: DISCONTINUED | OUTPATIENT
Start: 2023-02-10 | End: 2023-02-10 | Stop reason: HOSPADM

## 2023-02-10 RX ORDER — SODIUM CHLORIDE, SODIUM LACTATE, POTASSIUM CHLORIDE, CALCIUM CHLORIDE 600; 310; 30; 20 MG/100ML; MG/100ML; MG/100ML; MG/100ML
INJECTION, SOLUTION INTRAVENOUS CONTINUOUS
Status: DISCONTINUED | OUTPATIENT
Start: 2023-02-10 | End: 2023-02-10 | Stop reason: HOSPADM

## 2023-02-10 RX ORDER — SODIUM CHLORIDE, SODIUM LACTATE, POTASSIUM CHLORIDE, CALCIUM CHLORIDE 600; 310; 30; 20 MG/100ML; MG/100ML; MG/100ML; MG/100ML
INJECTION, SOLUTION INTRAVENOUS CONTINUOUS PRN
Status: DISCONTINUED | OUTPATIENT
Start: 2023-02-10 | End: 2023-02-10

## 2023-02-10 RX ORDER — HYDROCODONE BITARTRATE AND ACETAMINOPHEN 5; 325 MG/1; MG/1
1-2 TABLET ORAL EVERY 6 HOURS PRN
Qty: 20 TABLET | Refills: 0 | Status: SHIPPED | OUTPATIENT
Start: 2023-02-10 | End: 2024-01-18

## 2023-02-10 RX ORDER — SODIUM CHLORIDE, SODIUM LACTATE, POTASSIUM CHLORIDE, AND CALCIUM CHLORIDE .6; .31; .03; .02 G/100ML; G/100ML; G/100ML; G/100ML
IRRIGANT IRRIGATION PRN
Status: DISCONTINUED | OUTPATIENT
Start: 2023-02-10 | End: 2023-02-10 | Stop reason: HOSPADM

## 2023-02-10 RX ORDER — FENTANYL CITRATE 50 UG/ML
50 INJECTION, SOLUTION INTRAMUSCULAR; INTRAVENOUS EVERY 5 MIN PRN
Status: DISCONTINUED | OUTPATIENT
Start: 2023-02-10 | End: 2023-02-10 | Stop reason: HOSPADM

## 2023-02-10 RX ORDER — ONDANSETRON 4 MG/1
4 TABLET, ORALLY DISINTEGRATING ORAL EVERY 30 MIN PRN
Status: DISCONTINUED | OUTPATIENT
Start: 2023-02-10 | End: 2023-02-10 | Stop reason: HOSPADM

## 2023-02-10 RX ORDER — FENTANYL CITRATE 50 UG/ML
25 INJECTION, SOLUTION INTRAMUSCULAR; INTRAVENOUS
Status: DISCONTINUED | OUTPATIENT
Start: 2023-02-10 | End: 2023-02-10 | Stop reason: HOSPADM

## 2023-02-10 RX ORDER — BUPIVACAINE HYDROCHLORIDE 2.5 MG/ML
INJECTION, SOLUTION INFILTRATION; PERINEURAL PRN
Status: DISCONTINUED | OUTPATIENT
Start: 2023-02-10 | End: 2023-02-10 | Stop reason: HOSPADM

## 2023-02-10 RX ORDER — DEXAMETHASONE SODIUM PHOSPHATE 10 MG/ML
INJECTION, SOLUTION INTRAMUSCULAR; INTRAVENOUS PRN
Status: DISCONTINUED | OUTPATIENT
Start: 2023-02-10 | End: 2023-02-10

## 2023-02-10 RX ORDER — LIDOCAINE 40 MG/G
CREAM TOPICAL
Status: CANCELLED | OUTPATIENT
Start: 2023-02-10

## 2023-02-10 RX ORDER — SODIUM CHLORIDE, SODIUM LACTATE, POTASSIUM CHLORIDE, CALCIUM CHLORIDE 600; 310; 30; 20 MG/100ML; MG/100ML; MG/100ML; MG/100ML
INJECTION, SOLUTION INTRAVENOUS CONTINUOUS
Status: CANCELLED | OUTPATIENT
Start: 2023-02-10

## 2023-02-10 RX ORDER — HYDROCODONE BITARTRATE AND ACETAMINOPHEN 5; 325 MG/1; MG/1
1-2 TABLET ORAL EVERY 6 HOURS PRN
Qty: 20 TABLET | Refills: 0 | Status: SHIPPED | OUTPATIENT
Start: 2023-02-10 | End: 2023-02-10

## 2023-02-10 RX ORDER — ONDANSETRON 2 MG/ML
INJECTION INTRAMUSCULAR; INTRAVENOUS PRN
Status: DISCONTINUED | OUTPATIENT
Start: 2023-02-10 | End: 2023-02-10

## 2023-02-10 RX ORDER — GLYCOPYRROLATE 0.2 MG/ML
INJECTION, SOLUTION INTRAMUSCULAR; INTRAVENOUS PRN
Status: DISCONTINUED | OUTPATIENT
Start: 2023-02-10 | End: 2023-02-10

## 2023-02-10 RX ORDER — PROPOFOL 10 MG/ML
INJECTION, EMULSION INTRAVENOUS CONTINUOUS PRN
Status: DISCONTINUED | OUTPATIENT
Start: 2023-02-10 | End: 2023-02-10

## 2023-02-10 RX ORDER — KETAMINE HYDROCHLORIDE 10 MG/ML
INJECTION INTRAMUSCULAR; INTRAVENOUS PRN
Status: DISCONTINUED | OUTPATIENT
Start: 2023-02-10 | End: 2023-02-10

## 2023-02-10 RX ORDER — HYDROMORPHONE HYDROCHLORIDE 1 MG/ML
0.2 INJECTION, SOLUTION INTRAMUSCULAR; INTRAVENOUS; SUBCUTANEOUS
Status: DISCONTINUED | OUTPATIENT
Start: 2023-02-10 | End: 2023-02-10 | Stop reason: HOSPADM

## 2023-02-10 RX ORDER — LIDOCAINE HYDROCHLORIDE 10 MG/ML
INJECTION, SOLUTION INFILTRATION; PERINEURAL PRN
Status: DISCONTINUED | OUTPATIENT
Start: 2023-02-10 | End: 2023-02-10

## 2023-02-10 RX ADMIN — SODIUM CHLORIDE, POTASSIUM CHLORIDE, SODIUM LACTATE AND CALCIUM CHLORIDE: 600; 310; 30; 20 INJECTION, SOLUTION INTRAVENOUS at 05:36

## 2023-02-10 RX ADMIN — GLYCOPYRROLATE 0.2 MG: 0.2 INJECTION, SOLUTION INTRAMUSCULAR; INTRAVENOUS at 05:52

## 2023-02-10 RX ADMIN — LIDOCAINE HYDROCHLORIDE 30 MG: 10 INJECTION, SOLUTION INFILTRATION; PERINEURAL at 05:52

## 2023-02-10 RX ADMIN — FENTANYL CITRATE 50 MCG: 50 INJECTION, SOLUTION INTRAMUSCULAR; INTRAVENOUS at 07:38

## 2023-02-10 RX ADMIN — FENTANYL CITRATE 100 MCG: 50 INJECTION, SOLUTION INTRAMUSCULAR; INTRAVENOUS at 05:52

## 2023-02-10 RX ADMIN — ONDANSETRON 4 MG: 2 INJECTION INTRAMUSCULAR; INTRAVENOUS at 06:08

## 2023-02-10 RX ADMIN — KETAMINE HYDROCHLORIDE 30 MG: 10 INJECTION, SOLUTION INTRAMUSCULAR; INTRAVENOUS at 05:52

## 2023-02-10 RX ADMIN — MIDAZOLAM 2 MG: 1 INJECTION INTRAMUSCULAR; INTRAVENOUS at 05:44

## 2023-02-10 RX ADMIN — HYDROMORPHONE HYDROCHLORIDE 0.2 MG: 1 INJECTION, SOLUTION INTRAMUSCULAR; INTRAVENOUS; SUBCUTANEOUS at 01:36

## 2023-02-10 RX ADMIN — DEXAMETHASONE SODIUM PHOSPHATE 10 MG: 10 INJECTION, SOLUTION INTRAMUSCULAR; INTRAVENOUS at 05:52

## 2023-02-10 RX ADMIN — PROPOFOL 200 MG: 10 INJECTION, EMULSION INTRAVENOUS at 05:52

## 2023-02-10 RX ADMIN — KETOROLAC TROMETHAMINE 30 MG: 30 INJECTION, SOLUTION INTRAMUSCULAR at 06:57

## 2023-02-10 RX ADMIN — PROPOFOL 200 MCG/KG/MIN: 10 INJECTION, EMULSION INTRAVENOUS at 05:52

## 2023-02-10 RX ADMIN — ROCURONIUM BROMIDE 50 MG: 50 INJECTION, SOLUTION INTRAVENOUS at 05:52

## 2023-02-10 RX ADMIN — HYDROCODONE BITARTRATE AND ACETAMINOPHEN 1 TABLET: 5; 325 TABLET ORAL at 08:53

## 2023-02-10 RX ADMIN — SUGAMMADEX 200 MG: 100 INJECTION, SOLUTION INTRAVENOUS at 06:57

## 2023-02-10 RX ADMIN — PHENYLEPHRINE HYDROCHLORIDE 100 MCG: 10 INJECTION INTRAVENOUS at 05:52

## 2023-02-10 RX ADMIN — FENTANYL CITRATE 25 MCG: 50 INJECTION, SOLUTION INTRAMUSCULAR; INTRAVENOUS at 07:56

## 2023-02-10 RX ADMIN — PIPERACILLIN AND TAZOBACTAM 3.38 G: 3; .375 INJECTION, POWDER, LYOPHILIZED, FOR SOLUTION INTRAVENOUS at 01:36

## 2023-02-10 ASSESSMENT — ACTIVITIES OF DAILY LIVING (ADL)
ADLS_ACUITY_SCORE: 35

## 2023-02-10 NOTE — H&P
History and physical- Surgery  Priya Davis,  1985, MRN [id number]    Admitting Dx: Epigastric pain [R10.13]    PCP: Steph Huerta, [phone]   Code status:  No Order       Extended Emergency Contact Information  Primary Emergency Contact: Raymond Weston   [facility]  Home Phone: [phone]  Relation: Significant other       Assessment and Plan   Impression:  Acute cholecystitis.  The patient's pain is somewhat better but on ultrasound she has a thickened gallbladder wall with a mildly dilated common bile duct consistent with acute cholecystitis.  Her LFTs are normal save for a slightly elevated AST.  I definitely recommend laparoscopic cholecystectomy.  Risk and benefits of the surgery explained to the patient and she agrees to proceed.      Plan:  Laparoscopic cholecystectomy.  The patient should be able to be discharged home immediately following surgery.           Chief Complaint <principal problem not specified>       HPI    We have been requested by Dr. Franco  to evaluate Priya Davis for cholecystitis.  This is a 37 year old year old female presented to the emergency room last evening with abdominal pain.  She has had multiple mild episodes in the past few months but over the last couple weeks has had 2 particularly bad episodes.  This 1 just would not go away.  Started after dinner.  Located in the right upper quadrant.  She was somewhat improved after a GI cocktail but an ultrasound shows a thickened gallbladder wall with stones and a slightly dilated common bile duct.  In addition her white blood count is elevated.  Her only previous abdominal surgery is a tubal ligation.       Medical History  Patient Active Problem List   Diagnosis     Vitamin D Deficiency     Obesity (BMI 30.0-34.9)       [unfilled] Surgical History  Past Surgical History:   Procedure Laterality Date     POSTPARTUM TUBAL LIGATION Bilateral 2016    Procedure: TUBAL LIGATION POST PARTUM;  Surgeon: Jessenia Neal MD;   Location: [facility] OR;  Service:         Social History  Social History     Tobacco Use     Smoking status: Never     Smokeless tobacco: Never   Substance Use Topics     Alcohol use: No     Drug use: No       Allergies  Allergies   Allergen Reactions     No Known Drug Allergies Unknown    Family History  Reviewed, and family history includes Depression in her mother; Diabetes in her mother; Hyperlipidemia in her father; No Known Problems in her brother, brother, sister, sister, sister, sister, and sister.  The Family history is not pertinent to the patients chief complaint. Psychosocial Needs  Social History     Social History Narrative     Not on file     Additional psychosocial needs reviewed per nursing assessment.     Prior to Admission Medications   Current Outpatient Medications   Medication Instructions     benzonatate (TESSALON) 200 mg, Oral, 3 TIMES DAILY PRN     famotidine (PEPCID) 20 mg, Oral, AT BEDTIME     sucralfate (CARAFATE) 1 g, Oral, 4 TIMES DAILY           Review of Systems:  Pertinent items are noted in HPI. Physical Exam:  Temp:  [98.4  F (36.9  C)] 98.4  F (36.9  C)  Pulse:  [77-95] 77  Resp:  [16] 16  BP: (109-136)/(63-85) 117/85  SpO2:  [95 %-99 %] 98 %    General appearance: alert, appears stated age and cooperative  Eyes: No scleral icterus  Lungs: Breathing comfortably.  No shortness of breath  Heart: Regular.  Not tachycardic  Abdomen: Soft.  Minimal tenderness in the right upper quadrant.  Extremities: No abnormalities noted.    Skin: Skin color, texture, turgor normal. No rashes or lesions  Neurologic: Grossly normal       Pertinent Labs  Lab Results: personally reviewed.   Lab Results   Component Value Date     02/10/2023    CO2 26 02/10/2023    BUN 12.0 02/10/2023     Lab Results   Component Value Date    WBC 13.5 02/09/2023    HGB 13.4 02/09/2023    HCT 40.7 02/09/2023    MCV 89 02/09/2023     02/09/2023        Pertinent Radiology  Radiology Results: Personally  reviewed impression/s  EKG Results: not reviewed.

## 2023-02-10 NOTE — ANESTHESIA PREPROCEDURE EVALUATION
Anesthesia Pre-Procedure Evaluation    Patient: Priya Davis   MRN: [id number] : 1985        Procedure : Procedure(s):  CHOLECYSTECTOMY, LAPAROSCOPIC          Past Medical History:   Diagnosis Date     Intrauterine device (IUD) contraceptive failure resulting in pregnancy 2015      Past Surgical History:   Procedure Laterality Date     POSTPARTUM TUBAL LIGATION Bilateral 2016    Procedure: TUBAL LIGATION POST PARTUM;  Surgeon: Jessenia Neal MD;  [place] [facility] [place]  Service:       Allergies   Allergen Reactions     No Known Drug Allergies Unknown      Social History     Tobacco Use     Smoking status: Never     Smokeless tobacco: Never   Substance Use Topics     Alcohol use: No      Wt Readings from Last 1 Encounters:   23 87.2 kg (192 lb 4.8 oz)        Anesthesia Evaluation   Pt has had prior anesthetic.     No history of anesthetic complications       ROS/MED HX  ENT/Pulmonary:  - neg pulmonary ROS     Neurologic:  - neg neurologic ROS     Cardiovascular:  - neg cardiovascular ROS     METS/Exercise Tolerance: >4 METS    Hematologic:  - neg hematologic  ROS     Musculoskeletal:  - neg musculoskeletal ROS     GI/Hepatic:     (+) GERD, cholecystitis/cholelithiasis (acute cholecystitis),     Renal/Genitourinary:  - neg Renal ROS     Endo:     (+) Obesity (BMI 36),     Psychiatric/Substance Use:       Infectious Disease:       Malignancy:       Other:            Physical Exam    Airway        Mallampati: III   TM distance: > 3 FB   Neck ROM: full   Mouth opening: > 3 cm    Respiratory Devices and Support         Dental     Comment: good        Cardiovascular   cardiovascular exam normal          Pulmonary   pulmonary exam normal                OUTSIDE LABS:  CBC:   Lab Results   Component Value Date    WBC 13.5 (H) 2023    WBC 11.7 (H) 2023    HGB 13.4 2023    HGB 12.6 2023    HCT 40.7 2023    HCT 38.4 2023     2023      02/07/2023     BMP:   Lab Results   Component Value Date     02/10/2023     02/07/2023    POTASSIUM 3.7 02/10/2023    POTASSIUM 3.9 02/07/2023    CHLORIDE 101 02/10/2023    CHLORIDE 101 02/07/2023    CO2 26 02/10/2023    CO2 27 02/07/2023    BUN 12.0 02/10/2023    BUN 12.2 02/07/2023    CR 0.73 02/10/2023    CR 0.83 02/07/2023     (H) 02/10/2023     (H) 02/07/2023     COAGS:   Lab Results   Component Value Date    PTT 25 02/10/2023    INR 1.00 02/10/2023     POC:   Lab Results   Component Value Date    HCGS Negative 02/09/2023     HEPATIC:   Lab Results   Component Value Date    ALBUMIN 4.0 02/10/2023    PROTTOTAL 7.4 02/10/2023    ALT 35 02/10/2023    AST 73 (H) 02/10/2023    ALKPHOS 79 02/10/2023    BILITOTAL 0.6 02/10/2023     OTHER:   Lab Results   Component Value Date    LUIS 8.8 02/10/2023    MAG 1.7 02/07/2023    LIPASE 26 02/09/2023       Anesthesia Plan    ASA Status:  2, emergent    NPO Status:  NPO Appropriate    Anesthesia Type: General.     - Airway: ETT   Induction: Intravenous, RSI, Propofol.   Maintenance: TIVA.        Consents    Anesthesia Plan(s) and associated risks, benefits, and realistic alternatives discussed. Questions answered and patient/representative(s) expressed understanding.     - Discussed: Risks, Benefits and Alternatives for BOTH SEDATION and the PROCEDURE were discussed     - Discussed with:  Patient      - Extended Intubation/Ventilatory Support Discussed: No.      - Patient is DNR/DNI Status: No    Use of blood products discussed: Yes.     - Discussed with: Patient.     - Consented: consented to blood products            Reason for refusal: other.     Postoperative Care    Pain management: Multi-modal analgesia.   PONV prophylaxis: Ondansetron (or other 5HT-3), Dexamethasone or Solumedrol     Comments:    Other Comments: RSI with rocuronium    Glidescope prn  Ketamine 30 mg with robinul  TIVA              Choco Silverio MD

## 2023-02-10 NOTE — ANESTHESIA PROCEDURE NOTES
Airway         Procedure Start/Stop Times: 2/10/2023 5:54 AM  Staff -        Performed By: CRNA  Consent for Airway        Urgency: elective  Indications and Patient Condition       Indications for airway management: allison-procedural       Induction type:intravenous       Mask difficulty assessment: 2 - vent by mask + OA or adjuvant +/- NMBA    Final Airway Details       Final airway type: endotracheal airway       Successful airway: ETT - single and Oral  Endotracheal Airway Details        ETT size (mm): 7.0       Cuffed: yes       Cuff volume (mL): 10       Successful intubation technique: video laryngoscopy       VL Blade Size: Glidescope 4       Adjucts: stylet       Position: Center       Measured from: gums/teeth       Secured at (cm): 22    Post intubation assessment        Placement verified by: capnometry, equal breath sounds and chest rise        Number of attempts at approach: 1       Secured with: silk tape       Ease of procedure: easy       Dentition: Intact and Unchanged    Medication(s) Administered   Medication Administration Time: 2/10/2023 5:54 AM

## 2023-02-10 NOTE — ED PROVIDER NOTES
NAME: Priya Davis  AGE: 37 year old female  YOB: 1985  MRN: [id number]  EVALUATION DATE & TIME: 2023 10:50 PM    PCP: Steph Huerta  ED PROVIDER: Yulisa Franco MD.    Chief Complaint   Patient presents with     Heartburn       FINAL IMPRESSION:  1. Epigastric pain        MEDICAL DECISION MAKIN:00 PM I met with the patient, obtained history, performed an initial exam, and discussed options and plan for diagnostics and treatment here in the ED.  12:30 AM Patient reassessed. She is feeling improved.   1:08 AM Spoke with Dr. Andrade, general surgery.   1:18 AM Updated the patient with treatment plan. Will transfer patient to the OR.     ED Course as of 02/10/23 0128   Thu 2023   2306 38 y/o F who presents with epigastric pain.  Patient was seen in the emergency department recently for this and had reassuring labs and improvement with GI cocktail and pepcid. She is having worsening epigastric pain that started 1 hour PTA after eating some pasta. She took pepcid, did not  the carafate yet. Dx includes but not limited to gastritis, cholecystitis, biliary colic, ulcer, pancreatitis, atypical ACS, among others. Plan for GI cocktail, labs and with 2nd visit/worsening pain will get imaging and will start with RUQ ultrasound.   2333 EKG is sinus rhythm with some T wave flattening, no ST elevation or depression.   Fri Feb 10, 2023   0030 RUQ US:   1.  Cholelithiasis. Gallbladder wall thickness upper limits of normal. Sonographic Akhtar sign is positive. Findings would be suspicious for potential cholecystitis in the appropriate clinical setting. Clinical correlation.  2.  Common bile duct upper limits of normal at 6 mm.  3.  Diffuse fatty infiltration of the liver.    Reevaluated patient and pain has resolved. No tenderness on repeat exam. AST 73, otherwise LFTs WNL, lipase normal. WBC 13.5.   0108 Discussed with general surgery, Dr. Andrade--plan for OR, possibly this morning. Will start  "zosyn.   0120 Updated patient. Pain is returning some, pain medication ordered. She is in agreement with plan and her questions were answered.       Medical Decision Making    History:    Supplemental history from: Documented in chart, if applicable    External Record(s) reviewed: Documented in chart, if applicable.    Work Up:    Chart documentation includes differential considered and any EKGs or imaging interpreted by provider.    In additional to work up documented, I considered the following work up: Documented in chart, if applicable.    External consultation:    Discussion of management with another provider: Documented in chart, if applicable    Complicating factors:    Care impacted by chronic illness: N/A    Care affected by social determinants of health: N/A    Disposition considerations: To OR    MEDICATIONS GIVEN IN THE EMERGENCY:  Medications   piperacillin-tazobactam (ZOSYN) 3.375 g vial to attach to  mL bag (has no administration in time range)   HYDROmorphone (PF) (DILAUDID) injection 0.2 mg (has no administration in time range)   lidocaine (viscous) (XYLOCAINE) 2 % 15 mL, alum & mag hydroxide-simethicone (MAALOX) 15 mL GI Cocktail (30 mLs Oral Given 2/9/23 2322)   ondansetron (ZOFRAN) injection 4 mg (4 mg Intravenous Given 2/9/23 2333)       NEW PRESCRIPTIONS STARTED AT TODAY'S ER VISIT:  New Prescriptions    No medications on file        =================================================================  HPI    Patient information was obtained from: patient   Use of : N/A       Priya Davis is a 37 year old female with a past medical history of obesity, who presents mid epigastric abdominal pain.    Patient here with sudden onset of mid-epigastric abdominal pain an hour after eating pasta. She describes the pain as a \"tight uncomfortable\" sensation. Pain does slightly radiate to the right but not all the way. Additionally, she is nauseous but no vomiting. She says this feels similar " to her prior pain from before but more severe. Last time, the medications she was given in the ED did provide relief. She has taken Pepcid today but has not gotten her Carafate filled. She has had a tubal ligation but no other abdominal surgeries. No fevers, cough, diarrhea, dysuria, hematuria, blood or dark stool or abnormal discharge. No other medical complaints at this time.     Chart Review:  2/6/23: [facility] ED: Patient presented for chest pain. EKG reassuring. GI cocktail and Pepcid was given. Hepatic function panel and lipase normal. BMP normal. Troponin negative. No imaging performed at the time. Patient discharged in stable condition.       REVIEW OF SYSTEMS   Review of Systems   Constitutional: Negative for fever.   Respiratory: Negative for cough.    Gastrointestinal: Positive for abdominal pain (mid-epigastric) and nausea. Negative for blood in stool, diarrhea and vomiting.   Genitourinary: Negative.  Negative for dysuria, hematuria and vaginal discharge.   All other systems reviewed and are negative.       PAST MEDICAL HISTORY:  Past Medical History:   Diagnosis Date     Intrauterine device (IUD) contraceptive failure resulting in pregnancy 5/27/2015       PAST SURGICAL HISTORY:  Past Surgical History:   Procedure Laterality Date     POSTPARTUM TUBAL LIGATION Bilateral 7/28/2016    Procedure: TUBAL LIGATION POST PARTUM;  Surgeon: Jessenia Neal MD;  Location: [facility] - [place];  Service:        CURRENT MEDICATIONS:      Current Facility-Administered Medications:      HYDROmorphone (PF) (DILAUDID) injection 0.2 mg, 0.2 mg, Intravenous, Q2H PRN, Yulisa Franco MD     piperacillin-tazobactam (ZOSYN) 3.375 g vial to attach to  mL bag, 3.375 g, Intravenous, Once, Yulisa Franco MD    Current Outpatient Medications:      benzonatate (TESSALON) 200 MG capsule, [BENZONATATE (TESSALON) 200 MG CAPSULE] Take 1 capsule (200 mg total) by mouth 3 (three) times a day as needed for cough., Disp: 15  "capsule, Rfl: 1     famotidine (PEPCID) 20 MG tablet, Take 1 tablet (20 mg) by mouth At Bedtime, Disp: 30 tablet, Rfl: 0     sucralfate (CARAFATE) 1 GM tablet, Take 1 tablet (1 g) by mouth 4 times daily for 7 days, Disp: 28 tablet, Rfl: 0    ALLERGIES:  Allergies   Allergen Reactions     No Known Drug Allergies Unknown       FAMILY HISTORY:  Family History   Problem Relation Age of Onset     Diabetes Mother      Depression Mother      Hyperlipidemia Father      No Known Problems Sister      No Known Problems Sister      No Known Problems Sister      No Known Problems Sister      No Known Problems Sister      No Known Problems Brother      No Known Problems Brother        SOCIAL HISTORY:   Social History     Socioeconomic History     Marital status: Single   Tobacco Use     Smoking status: Never     Smokeless tobacco: Never   Substance and Sexual Activity     Alcohol use: No     Drug use: No     Sexual activity: Yes     Partners: Male       PHYSICAL EXAM:    Vitals: /83 (BP Location: Left arm, Patient Position: Left side)   Pulse 95   Temp 98.4  F (36.9  C) (Oral)   Resp 16   Ht 1.549 m (5' 1\")   Wt 87.2 kg (192 lb 4.8 oz)   LMP 01/27/2023   SpO2 95%   BMI 36.33 kg/m     Constitutional: Well developed, well nourished. No acute distress.  HENT: Normocephalic, atraumatic. Neck-gross ROM intact.   Eyes: Pupils mid-range, sclera white  Respiratory: CTAB, no respiratory distress, speaks full sentences easily.  Cardiovascular: Normal heart rate, regular rhythm  GI: Soft, not distended, most tender int he epigastric area, mild RUQ tenderness, no guarding  Musculoskeletal: Moving all 4 extremities intentionally and without pain. No obvious deformity.  Skin: Warm, dry, no rash.  Neurologic: Alert & oriented, speech clear, moving all extremities spontaneously   Psychiatric: Affect normal, cooperative.     LAB:  All pertinent labs reviewed and interpreted.  Labs Ordered and Resulted from Time of ED Arrival to Time " of ED Departure   BASIC METABOLIC PANEL - Abnormal       Result Value    Sodium 137      Potassium 3.7      Chloride 101      Carbon Dioxide (CO2) 26      Anion Gap 10      Urea Nitrogen 12.0      Creatinine 0.73      Calcium 8.8      Glucose 158 (*)     GFR Estimate >90     HEPATIC FUNCTION PANEL - Abnormal    Protein Total 7.4      Albumin 4.0      Bilirubin Total 0.6      Alkaline Phosphatase 79      AST 73 (*)     ALT 35      Bilirubin Direct 0.26     CBC WITH PLATELETS AND DIFFERENTIAL - Abnormal    WBC Count 13.5 (*)     RBC Count 4.59      Hemoglobin 13.4      Hematocrit 40.7      MCV 89      MCH 29.2      MCHC 32.9      RDW 12.3      Platelet Count 440      % Neutrophils 72      % Lymphocytes 21      % Monocytes 5      % Eosinophils 1      % Basophils 1      % Immature Granulocytes 0      NRBCs per 100 WBC 0      Absolute Neutrophils 9.8 (*)     Absolute Lymphocytes 2.8      Absolute Monocytes 0.7      Absolute Eosinophils 0.2      Absolute Basophils 0.1      Absolute Immature Granulocytes 0.1      Absolute NRBCs 0.0     LIPASE - Normal    Lipase 26     TROPONIN T, HIGH SENSITIVITY - Normal    Troponin T, High Sensitivity <6     HCG QUALITATIVE PREGNANCY - Normal    hCG Serum Qualitative Negative     INR   PARTIAL THROMBOPLASTIN TIME   COVID-19 VIRUS (CORONAVIRUS) BY PCR   ABO/RH TYPE AND SCREEN       RADIOLOGY:  Abdomen US, limited (RUQ only)   Final Result   IMPRESSION:   1.  Cholelithiasis. Gallbladder wall thickness upper limits of normal. Sonographic Akhtar sign is positive. Findings would be suspicious for potential cholecystitis in the appropriate clinical setting. Clinical correlation.      2.  Common bile duct upper limits of normal at 6 mm.      3.  Diffuse fatty infiltration of the liver.                EKG:   Performed at: 9-FEV-2023 23:17  Impression: normal sinus rhythm  Rate: 88  Rhythm: normal sinus rhythm  QRS Interval: 90  QTc Interval: 440  Comparison: 06-FEB-2023 23:37, Nonspecific T wave  abnormality now evident in Lateral leads    I have independently reviewed and interpreted the EKG(s) documented above.     PROCEDURES:   None    I, Delia Hustong, am serving as a scribe to document services personally performed by Dr. Yulisa Franco based on my observation and the provider's statements to me. I, Yulisa Franco MD attest that Delia Weston is acting in a scribe capacity, has observed my performance of the services and has documented them in accordance with my direction.    Yulisa Franco M.D.  Emergency Medicine  [facility] EMERGENCY DEPARTMENT  [address] [place] [id number]  [phone]  Dept: [phone]     Yulisa Franco MD  02/10/23 0129

## 2023-02-10 NOTE — ANESTHESIA CARE TRANSFER NOTE
Patient: Priya Davis    Procedure: Procedure(s):  CHOLECYSTECTOMY, LAPAROSCOPIC       Diagnosis: Acute cholecystitis [K81.0]  Diagnosis Additional Information: No value filed.    Anesthesia Type:   General     Note:    Oropharynx: oropharynx clear of all foreign objects and spontaneously breathing  Level of Consciousness: awake and drowsy  Oxygen Supplementation: face mask  Level of Supplemental Oxygen (L/min / FiO2): 6  Independent Airway: airway patency satisfactory and stable  Dentition: dentition unchanged  Vital Signs Stable: post-procedure vital signs reviewed and stable  Report to RN Given: handoff report given  Patient transferred to: PACU  Comments: Pt transferred to PACU. Extubated in OR and spontaneously breathing with sufficient gas exchange. On 6L O2 via FM. No airway. VSS. Normothermic. Report to RN at bedside.   SYED Becker CRNA     Handoff Report: Identifed the Patient, Identified the Reponsible Provider, Reviewed the pertinent medical history, Discussed the surgical course, Reviewed Intra-OP anesthesia mangement and issues during anesthesia, Set expectations for post-procedure period and Allowed opportunity for questions and acknowledgement of understanding      Vitals:  Vitals Value Taken Time   /67 02/10/23 0713   Temp 37.1  C (98.7  F) 02/10/23 0713   Pulse 92 02/10/23 0713   Resp 16 02/10/23 0713   SpO2 100 % 02/10/23 0713       Electronically Signed By: SYED Simmons CRNA  February 10, 2023  7:14 AM

## 2023-02-10 NOTE — ANESTHESIA POSTPROCEDURE EVALUATION
Patient: Priya Davis    Procedure: Procedure(s):  CHOLECYSTECTOMY, LAPAROSCOPIC       Anesthesia Type:  General    Note:  Disposition: Outpatient   Postop Pain Control: Uneventful            Sign Out: Well controlled pain   PONV: No   Neuro/Psych: Uneventful            Sign Out: Acceptable/Baseline neuro status   Airway/Respiratory: Uneventful            Sign Out: Acceptable/Baseline resp. status   CV/Hemodynamics: Uneventful            Sign Out: Acceptable CV status; No obvious hypovolemia; No obvious fluid overload   Other NRE: NONE   DID A NON-ROUTINE EVENT OCCUR? No           Last vitals:  Vitals Value Taken Time   /83 02/10/23 0815   Temp 36.4  C (97.6  F) 02/10/23 0815   Pulse 86 02/10/23 0817   Resp 33 02/10/23 0817   SpO2 100 % 02/10/23 0816   Vitals shown include unvalidated device data.    Electronically Signed By: Liz Bradley MD  February 10, 2023  9:26 AM

## 2023-02-10 NOTE — OP NOTE
Operative Note    Name:  Priya Davis  PCP:  DhavalKvng chapmankirsty  Procedure Date:  2/10/2023       Procedure:  Procedure(s):  CHOLECYSTECTOMY, LAPAROSCOPIC     Pre-Procedure Diagnosis:  Acute cholecystitis [K81.0]     Post-Procedure Diagnosis:    Same     Surgeon(s):  Halima Andrade MD     Assistant: None      Anesthesia Type:  General       Findings:  Grossly distended gallbladder packed with stones and sludge.    Operative Report:    The patient was properly identified and brought to the operating suite where they were placed in the supine position, general anesthetic was administered and prepped and draped in a sterile fashion.  A small incision was made below the umbilicus and a Veress needle passed into the abdominal cavity which was insufflated with carbon dioxide.  A 5mm trocar port was then placed followed by the camera.  Under direct vision a 10 mm port was placed in the epigastrium and two 5 mm ports in the right upper quadrant.  The gallbladder was visualized.  It was grossly distended and mildly edematous.  With traction on the gallbladder dissection was carried out in the triangle of Calot where the cystic duct and artery were carefully identified, clipped and divided.  The gallbladder was removed from the gallbladder bed with electrocautery with no difficulty and pulled up to the epigastric incision.  I did spend a fair amount of time digging a large amount of stones and sludge out of the gallbladder before I could pull it up through the small incision.  Eventually the gallbladder was removed.  The port was replaced and the entire area irrigated until clear.  Hemostasis was assured.  All the ports removed and each site closed with subcuticular sutures of 4-0 Monocryl.  Sterile dressings were placed.  Each site was also infiltrated with quarter percent Marcaine for a total of 40 mL.  The patient tolerated the procedure well.    Estimated Blood Loss:   5 cc    Specimens:    ID Type Source Tests Collected by  Time Destination   1 : Gallbladder and stones Tissue Gallbladder with Stone(s) SURGICAL PATHOLOGY EXAM Halima Andrade MD 2/10/2023  6:41 AM            Drains:   NG/OG/NJ Tube Orogastric 18 fr Center mouth (Active)       Complications:    None    Halima Andrade MD     Date: 2/10/2023  Time: 7:04 AM

## 2023-02-10 NOTE — ED TRIAGE NOTES
"Patient comes in with \"heartburn\" and was seen was the other day for it. Today, she had another episode, where she feels about of \"pressure\" in her epigastric regions, this started after eating. Patient took a Pepcid 15 mins ago. Here with       Triage Assessment     Row Name 02/09/23 4004       Triage Assessment (Adult)    Airway WDL WDL              "

## 2023-02-10 NOTE — ED NOTES
Pt has been resting comfortable in room. Minimal pain since last PRN medication administered. Denies any nausea. Has been NPO since around 1900 yesterday.  OR called and are ready for Pt. Pt transferred to PACU accompanied by .

## 2023-02-13 LAB
PATH REPORT.COMMENTS IMP SPEC: NORMAL
PATH REPORT.COMMENTS IMP SPEC: NORMAL
PATH REPORT.FINAL DX SPEC: NORMAL
PATH REPORT.GROSS SPEC: NORMAL
PATH REPORT.MICROSCOPIC SPEC OTHER STN: NORMAL
PATH REPORT.RELEVANT HX SPEC: NORMAL
PHOTO IMAGE: NORMAL

## 2023-02-13 PROCEDURE — 88304 TISSUE EXAM BY PATHOLOGIST: CPT | Mod: 26 | Performed by: PATHOLOGY

## 2023-02-17 LAB
ATRIAL RATE - MUSE: 88 BPM
DIASTOLIC BLOOD PRESSURE - MUSE: NORMAL MMHG
INTERPRETATION ECG - MUSE: NORMAL
P AXIS - MUSE: 42 DEGREES
PR INTERVAL - MUSE: 156 MS
QRS DURATION - MUSE: 90 MS
QT - MUSE: 364 MS
QTC - MUSE: 440 MS
R AXIS - MUSE: 62 DEGREES
SYSTOLIC BLOOD PRESSURE - MUSE: NORMAL MMHG
T AXIS - MUSE: 36 DEGREES
VENTRICULAR RATE- MUSE: 88 BPM

## 2023-02-20 ENCOUNTER — TELEPHONE (OUTPATIENT)
Dept: FAMILY MEDICINE | Facility: CLINIC | Age: 38
End: 2023-02-20
Payer: COMMERCIAL

## 2023-02-20 NOTE — TELEPHONE ENCOUNTER
Patient Returning Call    Reason for call: Pt is asking for a note to RTW today.  Pt is aware PCP not in clinic until tomorrow pm.  Would Covering provider be able to write the note so pt could come pick it up?      Information relayed to patient:  Will send to PCP and covering, but may not receive note till tomorrow. Understood    Patient has additional questions:  no    Could we send this information to you in [facility] or would you prefer to receive a phone call?:   Patient would prefer a phone call     Okay to leave a detailed message?: Yes at Home number on file [phone] (home)    Call taken on 2/20/23 at 1130 am by YI Magdaleno

## 2023-02-20 NOTE — TELEPHONE ENCOUNTER
Pt called, she stated that an FMLA form was fax to PCP, she is checking the status. She was suppose to return to work today but wasn't cleared. Please follow back up with pt at your earliest convenient.  Thanks!

## 2023-02-21 ENCOUNTER — TELEPHONE (OUTPATIENT)
Dept: SURGERY | Facility: CLINIC | Age: 38
End: 2023-02-21
Payer: COMMERCIAL

## 2023-02-21 ENCOUNTER — TELEPHONE (OUTPATIENT)
Dept: FAMILY MEDICINE | Facility: CLINIC | Age: 38
End: 2023-02-21
Payer: COMMERCIAL

## 2023-02-21 NOTE — TELEPHONE ENCOUNTER
PAUL paperwork should have gone to her surgeon's office, not ours.  However, if we have the forms when I get to the office this afternoon, I can complete them.  Please make sure they are fully prepped with the dates of surgery and everything included.

## 2023-02-21 NOTE — TELEPHONE ENCOUNTER
[facility] Post-Op Phone Call                     Surgeon: Halima Andrade MD     Date of Surgery: Laparoscopic Cholecystectomy 02/10/2023  Discharge Date: 02/10/2023    Date/Time Called:   Date: 2/21/2023 Time: 1:55 PM   Attempt: First    Pain Control:  Intensity: No Pain (0)    Medications:  Narcotic Use - No    Incisions:  Drainage? clean and dry  Any fever type symptoms? No    GI:  Nausea? No  Vomiting? No  BM? Yes  Gas? Yes  Voiding Frequency? 4 or more/day   Appetite? Good    Activity:  Walking activity? Yes  Frequency/Type: Throughout the day  Restrictions: x 1 week    Return to Work Plans?  Expected date 02/20/2023  Do you need anything from us in this regard? Yes     Post-op appointment made? No    Patient is doing very well since surgery. She has minimal pain and incisions are healing up well. She would like a note for work. Will send letter via [employer] and also fill out [facility] paperwork. Encouraged her to call if she has any concerns. She expressed understanding.       [facility]      Belinda Simon RN  [facility]  General Surgery  [facility] [address]  [email or web address]   Office:[phone]  Employed by [employer] [facility],

## 2023-02-21 NOTE — LETTER
February 21, 2023      Priya Davis  [address] [place] [address]        To Whom It May Concern:    Priya Davis may return to work 2/22/23 without restrictions.      Sincerely,        Steph Huerta MD

## 2023-02-21 NOTE — TELEPHONE ENCOUNTER
I called the patient and advised that surgeon's office should fill out FMLA paperwork, not primary care. Patient verbalizes her understanding.     Priya would like to know if Dr. Huerta will write her a return to work note? She states that she would like to return to work on 2/22/23 without restrictions.     VF notified patient that he will call her back with update once provider has reviewed.

## 2023-02-21 NOTE — TELEPHONE ENCOUNTER
Acknowledged.  Called patient with message left on vm to review letter sent via [employer].    FOREST Ragland, RN  [facility]

## 2023-02-21 NOTE — TELEPHONE ENCOUNTER
Reason for Call:  Other call back    Detailed comments: patient called and states message sent yesterday to Bradley Stroud at [facility] [employer] [employer].    Patient is on FMLA and needs a return to work letter from provider today.    Needs to states to return to work yesterday or February 20    Would like sent via My Chart.    Please contact patient.  Thank you.    Phone Number Patient can be reached at: Home number on file [phone] (home)    Best Time: any    Can we leave a detailed message on this number? YES    Call taken on 2/21/2023 at 7:04 AM by Karol Cochran

## 2023-03-06 ENCOUNTER — TELEPHONE (OUTPATIENT)
Dept: SURGERY | Facility: CLINIC | Age: 38
End: 2023-03-06
Payer: COMMERCIAL

## 2023-03-06 NOTE — TELEPHONE ENCOUNTER
Needs [facility] paperwork refaxed.  Her employer has not received it yet.  Fax number [phone].  Claim#[id number].    Thanks!

## 2023-08-12 ENCOUNTER — HEALTH MAINTENANCE LETTER (OUTPATIENT)
Age: 38
End: 2023-08-12

## 2023-08-22 ENCOUNTER — OFFICE VISIT (OUTPATIENT)
Dept: FAMILY MEDICINE | Facility: CLINIC | Age: 38
End: 2023-08-22
Payer: COMMERCIAL

## 2023-08-22 VITALS
DIASTOLIC BLOOD PRESSURE: 88 MMHG | HEART RATE: 78 BPM | TEMPERATURE: 97.9 F | SYSTOLIC BLOOD PRESSURE: 129 MMHG | OXYGEN SATURATION: 98 % | RESPIRATION RATE: 16 BRPM

## 2023-08-22 DIAGNOSIS — R05.1 ACUTE COUGH: Primary | ICD-10-CM

## 2023-08-22 PROCEDURE — 99203 OFFICE O/P NEW LOW 30 MIN: CPT | Performed by: PHYSICIAN ASSISTANT

## 2023-08-22 RX ORDER — CODEINE PHOSPHATE/GUAIFENESIN 10-100MG/5
10 LIQUID (ML) ORAL
Qty: 90 ML | Refills: 0 | Status: SHIPPED | OUTPATIENT
Start: 2023-08-22 | End: 2024-01-18

## 2023-08-22 RX ORDER — BENZONATATE 100 MG/1
100 CAPSULE ORAL 3 TIMES DAILY PRN
Qty: 21 CAPSULE | Refills: 0 | Status: SHIPPED | OUTPATIENT
Start: 2023-08-22 | End: 2024-01-18

## 2023-08-22 NOTE — PROGRESS NOTES
Patient presents with:  Cough: Covid pos /, persistent dry cough x 3wks, no headaches or dizziness, no fever, no chest pain or SOB      Clinical Decision Making:  Lingering COVID sxs. No PE findings concerning for severe COVID complication or secondary infection. Recommend supportive cares and patient was prescribed Tessalon Perles and Guaifenesin AC syrup to help aid in sleeping.       ICD-10-CM    1. Acute cough  R05.1 benzonatate (TESSALON) 100 MG capsule     guaiFENesin-codeine (GUAIFENESIN AC) 100-10 MG/5ML syrup          Patient Instructions   Take Tessalon Perles up to three times per day for cough suppression.   Take Guaifenesin AC syrup before bed.   Drink plenty of water.   Follow up if you're develop worsening symptoms, shortness of breath, chest pain, or fevers.     HPI:  Priya Davis is a 37 year old female who presents today complaining of ongoing cough x 3 weeks after having COVID starting 23. Patient denies HA, dizziness, CP, or SOB. She has been taking cough drops and Robitussin. Symptoms are most bothersome because they are keeping her awake at night. She is feeling tired, but otherwise ok.     History obtained from the patient.    Problem List:  2016: Pregnant  2016:  (normal spontaneous vaginal delivery)  2015: Obesity (BMI 30.0-34.9)  2015: Supervision of normal pregnancy  2015: Missed   2015: Pregnancy  2015: Threatened  in first trimester  2015: Intrauterine device (IUD) contraceptive failure resulting in   pregnancy  2014: Pregnant  Vitamin D Deficiency      Past Medical History:   Diagnosis Date    Intrauterine device (IUD) contraceptive failure resulting in pregnancy 2015       Social History     Tobacco Use    Smoking status: Never    Smokeless tobacco: Never   Substance Use Topics    Alcohol use: No       Review of Systems    Vitals:    23 0955   BP: 129/88   Pulse: 78   Resp: 16   Temp: 97.9  F (36.6  C)   TempSrc: Oral    SpO2: 98%       Physical Exam  Vitals and nursing note reviewed.   Constitutional:       General: She is not in acute distress.     Appearance: She is not toxic-appearing or diaphoretic.   HENT:      Head: Normocephalic and atraumatic.      Right Ear: External ear normal.      Left Ear: External ear normal.   Eyes:      Conjunctiva/sclera: Conjunctivae normal.   Cardiovascular:      Rate and Rhythm: Normal rate and regular rhythm.      Heart sounds: No murmur heard.  Pulmonary:      Effort: Pulmonary effort is normal. No respiratory distress.      Breath sounds: No stridor. No wheezing, rhonchi or rales.      Comments: Occasional dry cough  Neurological:      Mental Status: She is alert.   Psychiatric:         Mood and Affect: Mood normal.         Behavior: Behavior normal.         Thought Content: Thought content normal.         Judgment: Judgment normal.         At the end of the encounter, I discussed results, diagnosis, medications. Discussed red flags for immediate return to clinic/ER, as well as indications for follow up if no improvement. Patient understood and agreed to plan. Patient was stable for discharge.

## 2023-08-22 NOTE — PATIENT INSTRUCTIONS
Take Tessalon Perles up to three times per day for cough suppression.   Take Guaifenesin AC syrup before bed.   Drink plenty of water.   Follow up if you're develop worsening symptoms, shortness of breath, chest pain, or fevers.

## 2024-01-18 ENCOUNTER — HOSPITAL ENCOUNTER (OUTPATIENT)
Dept: CT IMAGING | Facility: HOSPITAL | Age: 39
Discharge: HOME | End: 2024-01-18
Attending: STUDENT IN AN ORGANIZED HEALTH CARE EDUCATION/TRAINING PROGRAM | Admitting: STUDENT IN AN ORGANIZED HEALTH CARE EDUCATION/TRAINING PROGRAM
Payer: COMMERCIAL

## 2024-01-18 ENCOUNTER — OFFICE VISIT (OUTPATIENT)
Dept: FAMILY MEDICINE | Facility: CLINIC | Age: 39
End: 2024-01-18
Payer: COMMERCIAL

## 2024-01-18 VITALS
BODY MASS INDEX: 36.09 KG/M2 | RESPIRATION RATE: 18 BRPM | OXYGEN SATURATION: 98 % | HEART RATE: 82 BPM | SYSTOLIC BLOOD PRESSURE: 134 MMHG | DIASTOLIC BLOOD PRESSURE: 88 MMHG | WEIGHT: 191 LBS | TEMPERATURE: 98 F

## 2024-01-18 DIAGNOSIS — N23 RENAL COLIC ON LEFT SIDE: ICD-10-CM

## 2024-01-18 DIAGNOSIS — R10.9 LEFT FLANK PAIN: Primary | ICD-10-CM

## 2024-01-18 PROBLEM — E55.9 VITAMIN D DEFICIENCY: Status: ACTIVE | Noted: 2024-01-18

## 2024-01-18 LAB
ALBUMIN UR-MCNC: NEGATIVE MG/DL
ANION GAP SERPL CALCULATED.3IONS-SCNC: 7 MMOL/L (ref 7–15)
APPEARANCE UR: CLEAR
BACTERIA #/AREA URNS HPF: ABNORMAL /HPF
BILIRUB UR QL STRIP: NEGATIVE
BUN SERPL-MCNC: 9.8 MG/DL (ref 6–20)
CALCIUM SERPL-MCNC: 8.8 MG/DL (ref 8.6–10)
CHLORIDE SERPL-SCNC: 103 MMOL/L (ref 98–107)
COLOR UR AUTO: YELLOW
CREAT SERPL-MCNC: 0.75 MG/DL (ref 0.51–0.95)
DEPRECATED HCO3 PLAS-SCNC: 27 MMOL/L (ref 22–29)
EGFRCR SERPLBLD CKD-EPI 2021: >90 ML/MIN/1.73M2
GLUCOSE SERPL-MCNC: 85 MG/DL (ref 70–99)
GLUCOSE UR STRIP-MCNC: NEGATIVE MG/DL
HGB UR QL STRIP: ABNORMAL
KETONES UR STRIP-MCNC: NEGATIVE MG/DL
LEUKOCYTE ESTERASE UR QL STRIP: ABNORMAL
NITRATE UR QL: NEGATIVE
PH UR STRIP: 5.5 [PH] (ref 5–8)
POTASSIUM SERPL-SCNC: 3.8 MMOL/L (ref 3.4–5.3)
RBC #/AREA URNS AUTO: ABNORMAL /HPF
SODIUM SERPL-SCNC: 137 MMOL/L (ref 135–145)
SP GR UR STRIP: 1.01 (ref 1–1.03)
SQUAMOUS #/AREA URNS AUTO: ABNORMAL /LPF
UROBILINOGEN UR STRIP-ACNC: 0.2 E.U./DL
WBC #/AREA URNS AUTO: ABNORMAL /HPF

## 2024-01-18 PROCEDURE — 74176 CT ABD & PELVIS W/O CONTRAST: CPT

## 2024-01-18 PROCEDURE — 99213 OFFICE O/P EST LOW 20 MIN: CPT | Performed by: STUDENT IN AN ORGANIZED HEALTH CARE EDUCATION/TRAINING PROGRAM

## 2024-01-18 PROCEDURE — 36415 COLL VENOUS BLD VENIPUNCTURE: CPT | Performed by: STUDENT IN AN ORGANIZED HEALTH CARE EDUCATION/TRAINING PROGRAM

## 2024-01-18 PROCEDURE — 81001 URINALYSIS AUTO W/SCOPE: CPT | Performed by: STUDENT IN AN ORGANIZED HEALTH CARE EDUCATION/TRAINING PROGRAM

## 2024-01-18 PROCEDURE — 80048 BASIC METABOLIC PNL TOTAL CA: CPT | Performed by: STUDENT IN AN ORGANIZED HEALTH CARE EDUCATION/TRAINING PROGRAM

## 2024-01-18 NOTE — PATIENT INSTRUCTIONS
Return to UC if you develop a fever, pain radiating down your left leg, pain not resolving with Tylenol/Ibuprofen, pain with urination.    You can use heat, ice on your back for pain relief      Ibuprofen 800mg every 8h as needed

## 2024-01-18 NOTE — PROGRESS NOTES
Assessment & Plan     Left flank pain  38yr old female with no history of kidney stones or nephrolithiasis presenting with 2 weeks of L flank pain, fluctuating from sharp to dull/aching. She denies trauma and my exam was negative for bony tenderness or step-offs so unlikely spinal fracture. Her pain not reproducible with palpation and had a negative straight leg raise. In the setting of L colicky flank pain for 2 weeks, now with UA showing gross hematuria, discussed with patient and will obtained CT. BMP with normal renal function, CT negative for stone in urinary tract. She declined pain medication in clinic today or muscle relaxers. No red flag symptoms so recommended ice/heat and Ibuprofen for pain management. Return precautions provided.    - UA Macroscopic with reflex to Microscopic and Culture  - UA Microscopic with Reflex to Culture  - Basic metabolic panel  - CT Abdomen Pelvis w/o Contrast  - Basic metabolic panel      Return for In clinic with your primary care provider.    Emmy Waller, DO  she/her  [facility]    Subjective     Priya Davis is a 38 year old female who presents to clinic today for the following health issues:    HPI    MS Injury/Pain    Onset of symptoms was 2 week(s) ago. Started as 1 week of sharp/stabbing on and off mostly in the morning, now still achy and dull, feels like it's deep  Location: left lower back  Context: no injury or trauma  Not like prior back pain she's had  No blood in urine  No pain with urination, fever, chills, night sweats, vomiting, diarrhea  No tingling or numbness down left leg  Worse: movement like bending down to pick something up  Better: sitting  Therapies to improve symptoms include: heat, ice, Tylenol, Ibuprofen helped  This is the first time this type of problem has occurred for this patient.     Past Medical History:   Diagnosis Date    Intrauterine device (IUD) contraceptive failure resulting in pregnancy 5/27/2015     Allergies    Allergen Reactions    No Known Drug Allergy Unknown     No current outpatient medications on file.     No current facility-administered medications for this visit.      Review of Systems  Constitutional, HEENT, cardiovascular, pulmonary, gi and gu systems are negative, except as otherwise noted.      Objective    /88 (BP Location: Right arm, Patient Position: Sitting, Cuff Size: Adult Regular)   Pulse 82   Temp 98  F (36.7  C) (Oral)   Resp 18   Wt 86.6 kg (191 lb)   LMP 01/08/2024 (Exact Date)   SpO2 98%   BMI 36.09 kg/m    Physical Exam  HENT:      Head: Normocephalic.   Cardiovascular:      Rate and Rhythm: Normal rate.   Pulmonary:      Effort: Pulmonary effort is normal.   Abdominal:      General: Abdomen is flat. There is no distension.      Tenderness: There is no abdominal tenderness. There is no right CVA tenderness, left CVA tenderness, guarding or rebound.      Comments: No suprapubic tenderness to palpation   Musculoskeletal:      Cervical back: Normal and normal range of motion. No bony tenderness.      Thoracic back: Normal. No bony tenderness.      Lumbar back: Tenderness (Left L1) present. No bony tenderness. Negative right straight leg raise test and negative left straight leg raise test.        Back:    Skin:     General: Skin is warm.      Comments: No rash, lesion, bruising over L thoracic or lumbar spine   Neurological:      Mental Status: She is alert.        Results for orders placed or performed in visit on 01/18/24   UA Macroscopic with reflex to Microscopic and Culture     Status: Abnormal    Specimen: Urine, Clean Catch   Result Value Ref Range    Color Urine Yellow Colorless, Straw, Light Yellow, Yellow    Appearance Urine Clear Clear    Glucose Urine Negative Negative mg/dL    Bilirubin Urine Negative Negative    Ketones Urine Negative Negative mg/dL    Specific Gravity Urine 1.015 1.005 - 1.030    Blood Urine Small (A) Negative    pH Urine 5.5 5.0 - 8.0    Protein Albumin  Urine Negative Negative mg/dL    Urobilinogen Urine 0.2 0.2, 1.0 E.U./dL    Nitrite Urine Negative Negative    Leukocyte Esterase Urine Small (A) Negative   UA Microscopic with Reflex to Culture     Status: Abnormal   Result Value Ref Range    Bacteria Urine Few (A) None Seen /HPF    RBC Urine 0-2 0-2 /HPF /HPF    WBC Urine 0-5 0-5 /HPF /HPF    Squamous Epithelials Urine Few (A) None Seen /LPF    Narrative    Urine Culture not indicated   Basic metabolic panel     Status: Normal   Result Value Ref Range    Sodium 137 135 - 145 mmol/L    Potassium 3.8 3.4 - 5.3 mmol/L    Chloride 103 98 - 107 mmol/L    Carbon Dioxide (CO2) 27 22 - 29 mmol/L    Anion Gap 7 7 - 15 mmol/L    Urea Nitrogen 9.8 6.0 - 20.0 mg/dL    Creatinine 0.75 0.51 - 0.95 mg/dL    GFR Estimate >90 >60 mL/min/1.73m2    Calcium 8.8 8.6 - 10.0 mg/dL    Glucose 85 70 - 99 mg/dL   Results for orders placed or performed during the hospital encounter of 01/18/24   CT Abdomen Pelvis w/o Contrast     Status: None    Narrative    EXAM: CT ABDOMEN PELVIS W/O CONTRAST  LOCATION: [facility]  DATE: 1/18/2024    INDICATION: Renal colic on left side.  COMPARISON: None.  TECHNIQUE: CT scan of the abdomen and pelvis was performed without IV contrast. Multiplanar reformats were obtained. Dose reduction techniques were used.  CONTRAST: None.    FINDINGS:   LOWER CHEST: Normal.    HEPATOBILIARY: Cholecystectomy.    PANCREAS: Normal.    SPLEEN: Normal.    ADRENAL GLANDS: Normal.    KIDNEYS/BLADDER: No hydronephrosis or urinary tract calculi on either side.    BOWEL: Normal.    LYMPH NODES: Normal.    VASCULATURE: Unremarkable.    PELVIC ORGANS: Normal.    MUSCULOSKELETAL: Normal.      Impression    IMPRESSION:   1.  Normal noncontrast CT of the abdomen and pelvis. No urinary tract calculi.             The use of [employer] services may have been used to construct the content in this note; any grammatical or spelling errors are  non-intentional. Please contact the author of this note directly if you are in need of any clarification.

## 2024-03-09 ENCOUNTER — HEALTH MAINTENANCE LETTER (OUTPATIENT)
Age: 39
End: 2024-03-09

## 2024-10-05 ENCOUNTER — HEALTH MAINTENANCE LETTER (OUTPATIENT)
Age: 39
End: 2024-10-05

## (undated) DEVICE — Device

## (undated) DEVICE — ENDO TROCAR FIRST ENTRY KII FIOS Z-THRD 11X100MM CTF33

## (undated) DEVICE — PREP CHLORAPREP 26ML TINTED HI-LITE ORANGE 930815

## (undated) DEVICE — ENDO TROCAR FIRST ENTRY KII FIOS Z-THRD 05X100MM CTF03

## (undated) DEVICE — DRSG STERI STRIP 1/2X4" R1547

## (undated) DEVICE — SUCTION MANIFOLD NEPTUNE 2 SYS 1 PORT 702-025-000

## (undated) DEVICE — GOWN LG DISP 9515

## (undated) DEVICE — SOL WATER IRRIG 1000ML BOTTLE 2F7114

## (undated) DEVICE — CUSTOM PACK LAP CHOLE SBA5BLCHEA

## (undated) DEVICE — NDL INSUFFLATION 13GA 120MM C2201

## (undated) DEVICE — TUBING SMOKE EVAC PNEUMOCLEAR HIGH FLOW 0620050250

## (undated) DEVICE — PLATE GROUNDING ADULT W/CORD 9165L

## (undated) DEVICE — SU MONOCRYL+ 4-0 18IN PS2 UND MCP496G

## (undated) DEVICE — GLOVE SURGEON PI ORTHO SZ 6-1/2 LF

## (undated) DEVICE — ENDO SHEARS RENEW LAP ENDOCUT SCISSOR TIP 16.5MM 3142

## (undated) DEVICE — CLIP APPLIER ENDO ROTATING 10MM MED/LG ER320

## (undated) DEVICE — GLOVE SURG PI ULTRA TOUCH M SZ 6-1/2 LF

## (undated) DEVICE — SOL RINGERS LACTATED 1000ML BAG 2B2324X

## (undated) DEVICE — ENDO TROCAR SLEEVE KII Z-THREADED 05X100MM CTS02

## (undated) DEVICE — GLOVE UNDER INDICATOR PI SZ 7.0 LF 41670

## (undated) DEVICE — DECANTER VIAL 2006S

## (undated) DEVICE — TUBING LAP SUCT/IRRIG STRYKER 250070500

## (undated) DEVICE — DRESSING COVERLET STRIP 3/4 X 3 LF 230

## (undated) RX ORDER — PROPOFOL 10 MG/ML
INJECTION, EMULSION INTRAVENOUS
Status: DISPENSED
Start: 2023-02-10

## (undated) RX ORDER — DEXAMETHASONE SODIUM PHOSPHATE 10 MG/ML
INJECTION, SOLUTION INTRAMUSCULAR; INTRAVENOUS
Status: DISPENSED
Start: 2023-02-10

## (undated) RX ORDER — ONDANSETRON 2 MG/ML
INJECTION INTRAMUSCULAR; INTRAVENOUS
Status: DISPENSED
Start: 2023-02-10

## (undated) RX ORDER — BUPIVACAINE HYDROCHLORIDE 2.5 MG/ML
INJECTION, SOLUTION INFILTRATION; PERINEURAL
Status: DISPENSED
Start: 2023-02-10

## (undated) RX ORDER — KETAMINE HYDROCHLORIDE 10 MG/ML
INJECTION INTRAMUSCULAR; INTRAVENOUS
Status: DISPENSED
Start: 2023-02-10

## (undated) RX ORDER — FENTANYL CITRATE-0.9 % NACL/PF 10 MCG/ML
PLASTIC BAG, INJECTION (ML) INTRAVENOUS
Status: DISPENSED
Start: 2023-02-10

## (undated) RX ORDER — FENTANYL CITRATE 50 UG/ML
INJECTION, SOLUTION INTRAMUSCULAR; INTRAVENOUS
Status: DISPENSED
Start: 2023-02-10